# Patient Record
Sex: MALE | Employment: UNEMPLOYED | URBAN - METROPOLITAN AREA
[De-identification: names, ages, dates, MRNs, and addresses within clinical notes are randomized per-mention and may not be internally consistent; named-entity substitution may affect disease eponyms.]

---

## 2017-02-04 ENCOUNTER — ED HISTORICAL/CONVERTED ENCOUNTER (OUTPATIENT)
Dept: OTHER | Age: 22
End: 2017-02-04

## 2017-02-23 ENCOUNTER — OP HISTORICAL/CONVERTED ENCOUNTER (OUTPATIENT)
Dept: OTHER | Age: 22
End: 2017-02-23

## 2022-07-22 ENCOUNTER — HOSPITAL ENCOUNTER (EMERGENCY)
Age: 27
Discharge: HOME | End: 2022-07-22
Attending: EMERGENCY MEDICINE | Admitting: EMERGENCY MEDICINE
Payer: MEDICAID

## 2022-07-22 ENCOUNTER — APPOINTMENT (OUTPATIENT)
Dept: CT IMAGING | Age: 27
End: 2022-07-22
Attending: NURSE PRACTITIONER
Payer: MEDICAID

## 2022-07-22 VITALS
WEIGHT: 235 LBS | RESPIRATION RATE: 20 BRPM | HEART RATE: 108 BPM | BODY MASS INDEX: 31.83 KG/M2 | HEIGHT: 72 IN | OXYGEN SATURATION: 92 % | DIASTOLIC BLOOD PRESSURE: 63 MMHG | SYSTOLIC BLOOD PRESSURE: 92 MMHG

## 2022-07-22 DIAGNOSIS — F48.9 MENTAL HEALTH PROBLEM: Primary | ICD-10-CM

## 2022-07-22 LAB
ALBUMIN SERPL-MCNC: 4.6 G/DL (ref 3.5–5)
ALBUMIN/GLOB SERPL: 1 {RATIO} (ref 1.1–2.2)
ALP SERPL-CCNC: 71 U/L (ref 45–117)
ALT SERPL-CCNC: 18 U/L (ref 12–78)
ANION GAP SERPL CALC-SCNC: 6 MMOL/L (ref 5–15)
APAP SERPL-MCNC: <10 UG/ML (ref 10–30)
AST SERPL W P-5'-P-CCNC: 12 U/L (ref 15–37)
BASOPHILS # BLD: 0 K/UL (ref 0–0.1)
BASOPHILS NFR BLD: 0 % (ref 0–1)
BILIRUB SERPL-MCNC: 0.6 MG/DL (ref 0.2–1)
BUN SERPL-MCNC: 15 MG/DL (ref 6–20)
BUN/CREAT SERPL: 24 (ref 12–20)
CA-I BLD-MCNC: 10.3 MG/DL (ref 8.5–10.1)
CHLORIDE SERPL-SCNC: 107 MMOL/L (ref 97–108)
CO2 SERPL-SCNC: 25 MMOL/L (ref 21–32)
CREAT SERPL-MCNC: 0.62 MG/DL (ref 0.7–1.3)
DIFFERENTIAL METHOD BLD: ABNORMAL
EOSINOPHIL # BLD: 0 K/UL (ref 0–0.4)
EOSINOPHIL NFR BLD: 0 % (ref 0–7)
ERYTHROCYTE [DISTWIDTH] IN BLOOD BY AUTOMATED COUNT: 13.8 % (ref 11.5–14.5)
ETHANOL SERPL-MCNC: <10 MG/DL
GLOBULIN SER CALC-MCNC: 4.7 G/DL (ref 2–4)
GLUCOSE SERPL-MCNC: 94 MG/DL (ref 65–100)
HCT VFR BLD AUTO: 44.6 % (ref 36.6–50.3)
HGB BLD-MCNC: 14 G/DL (ref 12.1–17)
IMM GRANULOCYTES # BLD AUTO: 0 K/UL (ref 0–0.04)
IMM GRANULOCYTES NFR BLD AUTO: 0 % (ref 0–0.5)
LYMPHOCYTES # BLD: 1.5 K/UL (ref 0.8–3.5)
LYMPHOCYTES NFR BLD: 20 % (ref 12–49)
MCH RBC QN AUTO: 22 PG (ref 26–34)
MCHC RBC AUTO-ENTMCNC: 31.4 G/DL (ref 30–36.5)
MCV RBC AUTO: 70.2 FL (ref 80–99)
MONOCYTES # BLD: 0.5 K/UL (ref 0–1)
MONOCYTES NFR BLD: 6 % (ref 5–13)
NEUTS SEG # BLD: 5.7 K/UL (ref 1.8–8)
NEUTS SEG NFR BLD: 74 % (ref 32–75)
NRBC # BLD: 0 K/UL (ref 0–0.01)
NRBC BLD-RTO: 0 PER 100 WBC
PLATELET # BLD AUTO: 220 K/UL (ref 150–400)
PMV BLD AUTO: 11.4 FL (ref 8.9–12.9)
POTASSIUM SERPL-SCNC: 4.2 MMOL/L (ref 3.5–5.1)
PROT SERPL-MCNC: 9.3 G/DL (ref 6.4–8.2)
RBC # BLD AUTO: 6.35 M/UL (ref 4.1–5.7)
SALICYLATES SERPL-MCNC: <1.7 MG/DL (ref 2.8–20)
SODIUM SERPL-SCNC: 138 MMOL/L (ref 136–145)
VALPROATE SERPL-MCNC: 95 UG/ML (ref 50–100)
WBC # BLD AUTO: 7.7 K/UL (ref 4.1–11.1)

## 2022-07-22 PROCEDURE — 96372 THER/PROPH/DIAG INJ SC/IM: CPT

## 2022-07-22 PROCEDURE — 99284 EMERGENCY DEPT VISIT MOD MDM: CPT

## 2022-07-22 PROCEDURE — 85025 COMPLETE CBC W/AUTO DIFF WBC: CPT

## 2022-07-22 PROCEDURE — 80143 DRUG ASSAY ACETAMINOPHEN: CPT

## 2022-07-22 PROCEDURE — 80179 DRUG ASSAY SALICYLATE: CPT

## 2022-07-22 PROCEDURE — 36415 COLL VENOUS BLD VENIPUNCTURE: CPT

## 2022-07-22 PROCEDURE — 80164 ASSAY DIPROPYLACETIC ACD TOT: CPT

## 2022-07-22 PROCEDURE — 70450 CT HEAD/BRAIN W/O DYE: CPT

## 2022-07-22 PROCEDURE — 80053 COMPREHEN METABOLIC PANEL: CPT

## 2022-07-22 PROCEDURE — 82077 ASSAY SPEC XCP UR&BREATH IA: CPT

## 2022-07-22 PROCEDURE — 74011250636 HC RX REV CODE- 250/636: Performed by: NURSE PRACTITIONER

## 2022-07-22 RX ORDER — ZIPRASIDONE MESYLATE 20 MG/ML
20 INJECTION, POWDER, LYOPHILIZED, FOR SOLUTION INTRAMUSCULAR
Status: COMPLETED | OUTPATIENT
Start: 2022-07-22 | End: 2022-07-22

## 2022-07-22 RX ADMIN — ZIPRASIDONE MESYLATE 20 MG: 20 INJECTION, POWDER, LYOPHILIZED, FOR SOLUTION INTRAMUSCULAR at 10:39

## 2022-07-22 NOTE — BSMART NOTE
Attempted to assess pt. Pt is resting with eyes closed as he received an injection in the ED and is unable to be aroused at this time by writer or mother for assessment. Mom states pt is not SI HI or having hallucinations. States he has been having altered mental since having a sx last Friday. States she took him to [facility] and he refused to talk with them and was instructed to bring him to the ED for a brain scan. Mom states he is Moderate ID and has behavioral issues. States he has never been IP .

## 2022-07-22 NOTE — ED PROVIDER NOTES
EMERGENCY DEPARTMENT HISTORY AND PHYSICAL EXAM      Date: 7/22/2022  Patient Name: Maury Hendrix    History of Presenting Illness     Chief Complaint   Patient presents with    Altered mental status       History Provided By: Patient's Father and Patient's Mother    HPI: Maury Hendrix, 32 y.o. male with a significant past medical history of intellectual disability, ADHD, seizures presents to the ED with altered mental status. Patient had a seizure last Friday. Per parents patient has not been acting on his norm. Patient usually able to cook for himself. Patient has not been doing this all week. Patient comes in for evaluation of this. Patient's family also requesting a head CT. There are no other complaints, changes, or physical findings at this time. PCP: None    No current facility-administered medications on file prior to encounter. No current outpatient medications on file prior to encounter. Past History     Past Medical History:  No past medical history on file. Past Surgical History:  No past surgical history on file. Family History:  No family history on file. Social History: Allergies:  No Known Allergies    Review of Systems   Review of Systems   Unable to perform ROS: Psychiatric disorder     Physical Exam   Physical Exam  Constitutional:       General: He is not in acute distress. Appearance: Normal appearance. He is obese. He is not ill-appearing or toxic-appearing. HENT:      Head: Normocephalic and atraumatic. Nose: Nose normal.      Mouth/Throat:      Mouth: Mucous membranes are moist.   Eyes:      Extraocular Movements: Extraocular movements intact. Pupils: Pupils are equal, round, and reactive to light. Cardiovascular:      Rate and Rhythm: Normal rate and regular rhythm. Pulmonary:      Effort: Pulmonary effort is normal.      Breath sounds: Normal breath sounds.    Abdominal:      General: Bowel sounds are normal. Musculoskeletal:         General: Normal range of motion. Cervical back: Normal range of motion and neck supple. Skin:     General: Skin is warm and dry. Capillary Refill: Capillary refill takes less than 2 seconds. Neurological:      General: No focal deficit present. Mental Status: He is alert. Mental status is at baseline. Psychiatric:         Attention and Perception: He is inattentive. Mood and Affect: Mood is anxious. Speech: Speech is delayed. Behavior: Behavior is agitated. Lab and Diagnostic Study Results   Labs -     Recent Results (from the past 12 hour(s))   METABOLIC PANEL, COMPREHENSIVE    Collection Time: 07/22/22  9:44 AM   Result Value Ref Range    Sodium 138 136 - 145 mmol/L    Potassium 4.2 3.5 - 5.1 mmol/L    Chloride 107 97 - 108 mmol/L    CO2 25 21 - 32 mmol/L    Anion gap 6 5 - 15 mmol/L    Glucose 94 65 - 100 mg/dL    BUN 15 6 - 20 mg/dL    Creatinine 0.62 (L) 0.70 - 1.30 mg/dL    BUN/Creatinine ratio 24 (H) 12 - 20      GFR est AA >60 >60 ml/min/1.73m2    GFR est non-AA >60 >60 ml/min/1.73m2    Calcium 10.3 (H) 8.5 - 10.1 mg/dL    Bilirubin, total 0.6 0.2 - 1.0 mg/dL    AST (SGOT) 12 (L) 15 - 37 U/L    ALT (SGPT) 18 12 - 78 U/L    Alk.  phosphatase 71 45 - 117 U/L    Protein, total 9.3 (H) 6.4 - 8.2 g/dL    Albumin 4.6 3.5 - 5.0 g/dL    Globulin 4.7 (H) 2.0 - 4.0 g/dL    A-G Ratio 1.0 (L) 1.1 - 2.2     CBC WITH AUTOMATED DIFF    Collection Time: 07/22/22  9:44 AM   Result Value Ref Range    WBC 7.7 4.1 - 11.1 K/uL    RBC 6.35 (H) 4.10 - 5.70 M/uL    HGB 14.0 12.1 - 17.0 g/dL    HCT 44.6 36.6 - 50.3 %    MCV 70.2 (L) 80.0 - 99.0 FL    MCH 22.0 (L) 26.0 - 34.0 PG    MCHC 31.4 30.0 - 36.5 g/dL    RDW 13.8 11.5 - 14.5 %    PLATELET 837 114 - 790 K/uL    MPV 11.4 8.9 - 12.9 FL    NRBC 0.0 0.0  WBC    ABSOLUTE NRBC 0.00 0.00 - 0.01 K/uL    NEUTROPHILS 74 32 - 75 %    LYMPHOCYTES 20 12 - 49 %    MONOCYTES 6 5 - 13 %    EOSINOPHILS 0 0 - 7 % BASOPHILS 0 0 - 1 %    IMMATURE GRANULOCYTES 0 0 - 0.5 %    ABS. NEUTROPHILS 5.7 1.8 - 8.0 K/UL    ABS. LYMPHOCYTES 1.5 0.8 - 3.5 K/UL    ABS. MONOCYTES 0.5 0.0 - 1.0 K/UL    ABS. EOSINOPHILS 0.0 0.0 - 0.4 K/UL    ABS. BASOPHILS 0.0 0.0 - 0.1 K/UL    ABS. IMM. GRANS. 0.0 0.00 - 0.04 K/UL    DF AUTOMATED     ETHYL ALCOHOL    Collection Time: 07/22/22  9:44 AM   Result Value Ref Range    ALCOHOL(ETHYL),SERUM <24 <90 mg/dL   SALICYLATE    Collection Time: 07/22/22  9:44 AM   Result Value Ref Range    Salicylate level <9.8 (L) 2.8 - 20.0 mg/dL   ACETAMINOPHEN    Collection Time: 07/22/22  9:44 AM   Result Value Ref Range    Acetaminophen level <10 (L) 10 - 30 ug/mL   VALPROIC ACID    Collection Time: 07/22/22  9:44 AM   Result Value Ref Range    Valproic acid 95 50 - 100 ug/mL       Radiologic Studies -   @lastxrresult@  CT Results  (Last 48 hours)                 07/22/22 1135  CT HEAD WO CONT Final result    Impression:  No acute process or change compared to the prior exam.               Narrative:  EXAM: CT HEAD WO CONT       INDICATION: ams       COMPARISON: 2/4/2017. CONTRAST: None. TECHNIQUE: Unenhanced CT of the head was performed using 5 mm images. Brain and   bone windows were generated. Coronal and sagittal reformats. CT dose reduction   was achieved through use of a standardized protocol tailored for this   examination and automatic exposure control for dose modulation. FINDINGS:   The ventricles and sulci are normal in size, shape and configuration. . There is   no significant white matter disease. There is no intracranial hemorrhage,   extra-axial collection, or mass effect. The basilar cisterns are open. No CT   evidence of acute infarct. The bone windows demonstrate no abnormalities. The visualized portions of the   paranasal sinuses and mastoid air cells are clear.                  CXR Results  (Last 48 hours)      None            Medical Decision Making and ED Course   - I am the first provider for this patient. I reviewed the vital signs, available nursing notes, past medical history, past surgical history, family history and social history. - Initial assessment performed. The patients presenting problems have been discussed, and they are in agreement with the care plan formulated and outlined with them. I have encouraged them to ask questions as they arise throughout their visit. Vital Signs-Reviewed the patient's vital signs. Patient Vitals for the past 12 hrs:   Pulse Resp BP SpO2   07/22/22 0912 (!) 108 20 92/63 92 %       Differential Diagnosis & Medical Decision Making Provider Note:   Patient presenting with altered mental status. Pt has stable vitals and POC glucose was checked immediately upon arrival. DDx: medication toxicity, infection, anemia, electrolyte/metabolic anomoly, hypercapnea, stroke/bleed/mass, dehydration, illicit drug intoxication. Will obtain labwork, UA, EKG and CT imaging of the head, chest xray. Will consider adding toxicologic workup if history unclear or warrants further investigation of toxic source. Will continue to monitor and reassess for admission. [facility]       ED Course:        Procedures   Performed by: Kari Terry NP  Procedures      Disposition   Disposition: DC- Pediatric Discharges: All of the diagnostic tests were reviewed with the parent and their questions were answered. The parent verbally convey understanding and agreement of the signs, symptoms, diagnosis, treatment and prognosis for the child and additionally agrees to follow up as recommended with the child's PCP in 24 - 48 hours. They also agree with the care-plan and conveys that all of their questions have been answered.   I have put together some discharge instructions for them that include: 1) educational information regarding their diagnosis, 2) how to care for the child's diagnosis at home, as well a 3) list of reasons why they would want to return the child to the ED prior to their follow-up appointment, should their condition change. Discharged    DISCHARGE PLAN:  1. There are no discharge medications for this patient. 2.   Follow-up Information       Follow up With Specialties Details Why Contact Info    Your PCP              3.  Return to ED if worse   4. There are no discharge medications for this patient. Diagnosis/Clinical Impression     Clinical Impression:   1. Mental health problem        Attestations: Liam ALEGRIA NP, am the primary clinician of record. Please note that this dictation was completed with [email or web address], the [employer] voice recognition software. Quite often unanticipated grammatical, syntax, homophones, and other interpretive errors are inadvertently transcribed by the computer software. Please disregard these errors. Please excuse any errors that have escaped final proofreading. Thank you.

## 2022-07-22 NOTE — ED TRIAGE NOTES
PER MOTHER PT HAD A SEIZURE LAST FRIDAY AND SINCE THEN BEHAVIOR HAS BEEN ERRATIC, AGITATED AND TEARFUL IN TRIAGE

## 2022-07-24 ENCOUNTER — HOSPITAL ENCOUNTER (INPATIENT)
Age: 27
LOS: 8 days | Discharge: HOME | DRG: 750 | End: 2022-08-03
Attending: EMERGENCY MEDICINE | Admitting: PSYCHIATRY & NEUROLOGY
Payer: MEDICAID

## 2022-07-24 DIAGNOSIS — F22 PARANOID BEHAVIOR (HCC): Primary | ICD-10-CM

## 2022-07-24 LAB
ALBUMIN SERPL-MCNC: 4.4 G/DL (ref 3.5–5)
ALBUMIN/GLOB SERPL: 1.1 {RATIO} (ref 1.1–2.2)
ALP SERPL-CCNC: 65 U/L (ref 45–117)
ALT SERPL-CCNC: 22 U/L (ref 12–78)
ANION GAP SERPL CALC-SCNC: 7 MMOL/L (ref 5–15)
AST SERPL W P-5'-P-CCNC: 28 U/L (ref 15–37)
BASOPHILS # BLD: 0 K/UL (ref 0–0.1)
BASOPHILS NFR BLD: 0 % (ref 0–1)
BILIRUB SERPL-MCNC: 0.6 MG/DL (ref 0.2–1)
BUN SERPL-MCNC: 13 MG/DL (ref 6–20)
BUN/CREAT SERPL: 21 (ref 12–20)
CA-I BLD-MCNC: 10.3 MG/DL (ref 8.5–10.1)
CHLORIDE SERPL-SCNC: 107 MMOL/L (ref 97–108)
CO2 SERPL-SCNC: 26 MMOL/L (ref 21–32)
CREAT SERPL-MCNC: 0.62 MG/DL (ref 0.7–1.3)
DIFFERENTIAL METHOD BLD: ABNORMAL
EOSINOPHIL # BLD: 0 K/UL (ref 0–0.4)
EOSINOPHIL NFR BLD: 0 % (ref 0–7)
ERYTHROCYTE [DISTWIDTH] IN BLOOD BY AUTOMATED COUNT: 13.1 % (ref 11.5–14.5)
ETHANOL SERPL-MCNC: <10 MG/DL
FLUAV RNA SPEC QL NAA+PROBE: NOT DETECTED
FLUBV RNA SPEC QL NAA+PROBE: NOT DETECTED
GLOBULIN SER CALC-MCNC: 4.1 G/DL (ref 2–4)
GLUCOSE SERPL-MCNC: 85 MG/DL (ref 65–100)
HCT VFR BLD AUTO: 42.2 % (ref 36.6–50.3)
HGB BLD-MCNC: 13.2 G/DL (ref 12.1–17)
IMM GRANULOCYTES # BLD AUTO: 0 K/UL (ref 0–0.04)
IMM GRANULOCYTES NFR BLD AUTO: 0 % (ref 0–0.5)
LYMPHOCYTES # BLD: 1.6 K/UL (ref 0.8–3.5)
LYMPHOCYTES NFR BLD: 15 % (ref 12–49)
MCH RBC QN AUTO: 22 PG (ref 26–34)
MCHC RBC AUTO-ENTMCNC: 31.3 G/DL (ref 30–36.5)
MCV RBC AUTO: 70.5 FL (ref 80–99)
MONOCYTES # BLD: 0.9 K/UL (ref 0–1)
MONOCYTES NFR BLD: 9 % (ref 5–13)
NEUTS SEG # BLD: 8 K/UL (ref 1.8–8)
NEUTS SEG NFR BLD: 76 % (ref 32–75)
NRBC # BLD: 0 K/UL (ref 0–0.01)
NRBC BLD-RTO: 0 PER 100 WBC
PLATELET # BLD AUTO: 180 K/UL (ref 150–400)
PMV BLD AUTO: 11.9 FL (ref 8.9–12.9)
POTASSIUM SERPL-SCNC: 3.8 MMOL/L (ref 3.5–5.1)
PROT SERPL-MCNC: 8.5 G/DL (ref 6.4–8.2)
RBC # BLD AUTO: 5.99 M/UL (ref 4.1–5.7)
SARS-COV-2, COV2: DETECTED
SODIUM SERPL-SCNC: 140 MMOL/L (ref 136–145)
VALPROATE SERPL-MCNC: 39 UG/ML (ref 50–100)
WBC # BLD AUTO: 10.5 K/UL (ref 4.1–11.1)

## 2022-07-24 PROCEDURE — 80053 COMPREHEN METABOLIC PANEL: CPT

## 2022-07-24 PROCEDURE — 80164 ASSAY DIPROPYLACETIC ACD TOT: CPT

## 2022-07-24 PROCEDURE — 99285 EMERGENCY DEPT VISIT HI MDM: CPT

## 2022-07-24 PROCEDURE — 82077 ASSAY SPEC XCP UR&BREATH IA: CPT

## 2022-07-24 PROCEDURE — 85025 COMPLETE CBC W/AUTO DIFF WBC: CPT

## 2022-07-24 PROCEDURE — 74011250636 HC RX REV CODE- 250/636: Performed by: NURSE PRACTITIONER

## 2022-07-24 PROCEDURE — 36415 COLL VENOUS BLD VENIPUNCTURE: CPT

## 2022-07-24 PROCEDURE — 87636 SARSCOV2 & INF A&B AMP PRB: CPT

## 2022-07-24 PROCEDURE — 74011250636 HC RX REV CODE- 250/636: Performed by: PHYSICIAN ASSISTANT

## 2022-07-24 PROCEDURE — 96372 THER/PROPH/DIAG INJ SC/IM: CPT

## 2022-07-24 RX ORDER — DIPHENHYDRAMINE HYDROCHLORIDE 50 MG/ML
25 INJECTION, SOLUTION INTRAMUSCULAR; INTRAVENOUS
Status: COMPLETED | OUTPATIENT
Start: 2022-07-24 | End: 2022-07-24

## 2022-07-24 RX ORDER — ZIPRASIDONE MESYLATE 20 MG/ML
20 INJECTION, POWDER, LYOPHILIZED, FOR SOLUTION INTRAMUSCULAR
Status: COMPLETED | OUTPATIENT
Start: 2022-07-24 | End: 2022-07-24

## 2022-07-24 RX ORDER — DIAZEPAM 10 MG/100UL
SPRAY NASAL
COMMUNITY
End: 2022-08-03

## 2022-07-24 RX ORDER — HALOPERIDOL 5 MG/ML
5 INJECTION INTRAMUSCULAR ONCE
Status: COMPLETED | OUTPATIENT
Start: 2022-07-24 | End: 2022-07-24

## 2022-07-24 RX ORDER — VALPROIC ACID 250 MG/5ML
1000 SOLUTION ORAL 2 TIMES DAILY
Status: DISPENSED | OUTPATIENT
Start: 2022-07-24 | End: 2022-07-29

## 2022-07-24 RX ORDER — ESLICARBAZEPINE ACETATE 800 MG/1
TABLET ORAL
COMMUNITY
End: 2022-08-03

## 2022-07-24 RX ORDER — ESLICARBAZEPINE ACETATE 400 MG/1
TABLET ORAL
COMMUNITY
End: 2022-08-03

## 2022-07-24 RX ORDER — VALPROIC ACID 250 MG/5ML
SOLUTION ORAL
COMMUNITY
End: 2022-08-03

## 2022-07-24 RX ADMIN — HALOPERIDOL LACTATE 5 MG: 5 INJECTION, SOLUTION INTRAMUSCULAR at 13:23

## 2022-07-24 RX ADMIN — DIPHENHYDRAMINE HYDROCHLORIDE 25 MG: 50 INJECTION, SOLUTION INTRAMUSCULAR; INTRAVENOUS at 13:23

## 2022-07-24 RX ADMIN — ZIPRASIDONE MESYLATE 20 MG: 20 INJECTION, POWDER, LYOPHILIZED, FOR SOLUTION INTRAMUSCULAR at 22:50

## 2022-07-24 RX ADMIN — ZIPRASIDONE MESYLATE 20 MG: 20 INJECTION, POWDER, LYOPHILIZED, FOR SOLUTION INTRAMUSCULAR at 13:23

## 2022-07-24 NOTE — BSMART NOTE
Copies of all labs resulted and facesheet faxed to Patricia with [employer] per her request.  She is aware that pt is COVID positive

## 2022-07-24 NOTE — ED NOTES
This RN, Johny Arzate RN, Ancelmo Powell PCA, and [employer] officer at bedside to obtain ordered labs and COVID swab. Pt thrashing around in stretcher and all staff having to restrain pt even with pt still handcuffed to stretcher. This RN unable to obtain urine sample at this time. TIMOTHY

## 2022-07-24 NOTE — ED NOTES
Pt continues to be agitated and attempting to get out of bed. Pt remains in  Bilateral handcuffs and bilateral leg restraints. [employer] officer and Sitter present.

## 2022-07-24 NOTE — BSMART NOTE
This writer left message on mother's voicemail to give her an update and to let her know that bed search is under way

## 2022-07-24 NOTE — ED TRIAGE NOTES
Per [place] [employer] parents made a call stating \"he's having a psychotic episode\". When [employer] arrived pt was locked in a car and was trying to get out of the car screaming \"my stepfather trying to kill me\". Pt brought in by [employer] w/ a paperless ECO. Pt continues to be aggressive and yelling at staff. Pt currently handcuffed to stretcher.

## 2022-07-24 NOTE — BSMART NOTE
Patricia ([employer]) called this writer to make me aware that pt was ECO. She has contacted [facility] (due to pts ID diagnosis). ECO start time 5am.  This writer will notify Patricia with pt able to be prescreened.   [facility] will join via iPad as well

## 2022-07-24 NOTE — BSMART NOTE
Patricia with [employer] attempted to complete prescreening assessment with pt along with Kurt Barry from [facility]. Pt will not cooperate. Pt yelling out 'my stepdad is trying to kill me' over and over. Pt NOT redirectable. Pts mother, Dax Back, completed interview with Patricia via iPad/Zoom. Pending medical clearance, pt will be recommended for TDO. Pt mother states pt takes Valproic Acid BID however did not take it today. Per Jose BELL verbal order, medication to be ordered by this writer in [employer].   Primary nurse Sharee Rubalcava, aware

## 2022-07-24 NOTE — ED PROVIDER NOTES
EMERGENCY DEPARTMENT HISTORY AND PHYSICAL EXAM      Date: 7/24/2022  Patient Name: Reford Runner    History of Presenting Illness     Chief Complaint   Patient presents with    Mental Health Problem       History Provided By: EMS    HPI: Reford Runner, 32 y.o. male with a significant past medical history of intellectual disability, ADHD, seizures who presents to the ED with psychotic episode\". When [employer] arrived pt was locked in a car and was trying to get out of the car screaming \"my stepfather trying to kill me\". Pt brought in by [employer] w/ a paperless ECO. Pt continues to be aggressive and yelling at staff. Pt currently handcuffed to stretcher. There are no other complaints, changes, or physical findings at this time. PCP: None    No current facility-administered medications on file prior to encounter. No current outpatient medications on file prior to encounter. Past History     Past Medical History:  No past medical history on file. Past Surgical History:  No past surgical history on file. Family History:  No family history on file. Social History: Allergies:  No Known Allergies    Review of Systems   Review of Systems   Unable to perform ROS: Psychiatric disorder     Physical Exam     Lab and Diagnostic Study Results   Labs -   No results found for this or any previous visit (from the past 12 hour(s)). Radiologic Studies -   @lastxrresult@  CT Results  (Last 48 hours)                 07/22/22 1135  CT HEAD WO CONT Final result    Impression:  No acute process or change compared to the prior exam.               Narrative:  EXAM: CT HEAD WO CONT       INDICATION: ams       COMPARISON: 2/4/2017. CONTRAST: None. TECHNIQUE: Unenhanced CT of the head was performed using 5 mm images. Brain and   bone windows were generated. Coronal and sagittal reformats.  CT dose reduction   was achieved through use of a standardized protocol tailored for this   examination and automatic exposure control for dose modulation. FINDINGS:   The ventricles and sulci are normal in size, shape and configuration. . There is   no significant white matter disease. There is no intracranial hemorrhage,   extra-axial collection, or mass effect. The basilar cisterns are open. No CT   evidence of acute infarct. The bone windows demonstrate no abnormalities. The visualized portions of the   paranasal sinuses and mastoid air cells are clear. CXR Results  (Last 48 hours)      None            Medical Decision Making and ED Course   - I am the first provider for this patient. I reviewed the vital signs, available nursing notes, past medical history, past surgical history, family history and social history. - Initial assessment performed. The patients presenting problems have been discussed, and they are in agreement with the care plan formulated and outlined with them. I have encouraged them to ask questions as they arise throughout their visit. Vital Signs-Reviewed the patient's vital signs. Patient Vitals for the past 12 hrs:   Pulse Resp BP SpO2   07/24/22 0952 96 18 104/62 99 %       Differential Diagnosis & Medical Decision Making Provider Note:   Order psych labs and   made aware of patient. MDM     Amount and/or Complexity of Data Reviewed  Clinical lab tests: ordered and reviewed    Risk of Complications, Morbidity, and/or Mortality  Presenting problems: high  Management options: high    Patient Progress  Patient progress: stable       ED Course:   ED Course as of 09/18/22 1530   Sun Jul 24, 2022   1848 Spoke with both district Plummer and Zulema Cohen. While patient is COVID-positive, the issue with admitting him to [facility] is that psychiatry normally does not admit intellectual disability unless he is high functioning.   While his mother has stated he is high functioning, patient has not been able to converse with us and show us that he is high functioning. Francesca Marshall will try to speak with Dr. Luciana Willard and do a reevaluation tomorrow to see if patient would be a potential psych admission here at [facility]. [JS]   Mon Jul 25, 2022   0155 Patient remains on one-to-one. Patient resting comfortably in bed [CB]   0656 COVID positive, needs re-eval by Dr. Marlen Vázquez for dispo. [SS]      ED Course User Index  [CB] Ana Paula Ybarra NP  [JS] Trixie Hernandez MD  [SS] Jammie Estrella MD       Procedures   Performed by: ARABELLA Quijano  Procedures    Disposition   Disposition: Admitted to [address] [place] [address] at [facility] the case was discussed with the admitting physician MATTHEW      DISCHARGE PLAN:  1. There are no discharge medications for this patient. 2.   Follow-up Information    None       3. Return to ED if worse   4. There are no discharge medications for this patient. Remove if admitted/transferred    Diagnosis/Clinical Impression     Clinical Impression: No diagnosis found. Pyschosis    Attestations: IDarell PA, am the primary clinician of record. Please note that this dictation was completed with [employer], the computer voice recognition software. Quite often unanticipated grammatical, syntax, homophones, and other interpretive errors are inadvertently transcribed by the computer software. Please disregard these errors. Please excuse any errors that have escaped final proofreading. Thank you.

## 2022-07-25 PROBLEM — U07.1 COVID-19: Status: ACTIVE | Noted: 2022-07-25

## 2022-07-25 PROBLEM — F23 ACUTE PSYCHOSIS (HCC): Status: ACTIVE | Noted: 2022-07-25

## 2022-07-25 LAB
AMPHET UR QL SCN: NEGATIVE
APPEARANCE UR: ABNORMAL
BACTERIA URNS QL MICRO: NEGATIVE /HPF
BARBITURATES UR QL SCN: NEGATIVE
BENZODIAZ UR QL: NEGATIVE
BILIRUB UR QL: NEGATIVE
CANNABINOIDS UR QL SCN: NEGATIVE
COCAINE UR QL SCN: NEGATIVE
COLOR UR: ABNORMAL
DRUG SCRN COMMENT,DRGCM: NORMAL
GLUCOSE UR STRIP.AUTO-MCNC: NEGATIVE MG/DL
HGB UR QL STRIP: ABNORMAL
KETONES UR QL STRIP.AUTO: 20 MG/DL
LEUKOCYTE ESTERASE UR QL STRIP.AUTO: ABNORMAL
METHADONE UR QL: NEGATIVE
MUCOUS THREADS URNS QL MICRO: ABNORMAL /LPF
NITRITE UR QL STRIP.AUTO: NEGATIVE
OPIATES UR QL: NEGATIVE
PCP UR QL: NEGATIVE
PH UR STRIP: 5 [PH] (ref 5–8)
PROT UR STRIP-MCNC: 100 MG/DL
RBC #/AREA URNS HPF: ABNORMAL /HPF (ref 0–5)
SP GR UR REFRACTOMETRY: 1.03 (ref 1–1.03)
UA: UC IF INDICATED,UAUC: ABNORMAL
UROBILINOGEN UR QL STRIP.AUTO: 2 EU/DL (ref 0.1–1)
WBC URNS QL MICRO: ABNORMAL /HPF (ref 0–4)

## 2022-07-25 PROCEDURE — 74011250636 HC RX REV CODE- 250/636: Performed by: INTERNAL MEDICINE

## 2022-07-25 PROCEDURE — 74011250637 HC RX REV CODE- 250/637: Performed by: PSYCHIATRY & NEUROLOGY

## 2022-07-25 PROCEDURE — 74011250637 HC RX REV CODE- 250/637: Performed by: INTERNAL MEDICINE

## 2022-07-25 PROCEDURE — 74011250637 HC RX REV CODE- 250/637: Performed by: PHYSICIAN ASSISTANT

## 2022-07-25 PROCEDURE — 81001 URINALYSIS AUTO W/SCOPE: CPT

## 2022-07-25 PROCEDURE — 80307 DRUG TEST PRSMV CHEM ANLYZR: CPT

## 2022-07-25 RX ORDER — SODIUM CHLORIDE 0.9 % (FLUSH) 0.9 %
5-40 SYRINGE (ML) INJECTION EVERY 8 HOURS
Status: DISCONTINUED | OUTPATIENT
Start: 2022-07-25 | End: 2022-08-03 | Stop reason: HOSPADM

## 2022-07-25 RX ORDER — ENOXAPARIN SODIUM 100 MG/ML
30 INJECTION SUBCUTANEOUS EVERY 12 HOURS
Status: DISCONTINUED | OUTPATIENT
Start: 2022-07-25 | End: 2022-08-03 | Stop reason: HOSPADM

## 2022-07-25 RX ORDER — DEXAMETHASONE 4 MG/1
6 TABLET ORAL DAILY
Status: COMPLETED | OUTPATIENT
Start: 2022-07-25 | End: 2022-08-03

## 2022-07-25 RX ORDER — ACETAMINOPHEN 650 MG/1
650 SUPPOSITORY RECTAL
Status: DISCONTINUED | OUTPATIENT
Start: 2022-07-25 | End: 2022-08-03 | Stop reason: HOSPADM

## 2022-07-25 RX ORDER — HYDROXYZINE 25 MG/1
50 TABLET, FILM COATED ORAL
Status: DISCONTINUED | OUTPATIENT
Start: 2022-07-25 | End: 2022-08-03 | Stop reason: HOSPADM

## 2022-07-25 RX ORDER — SODIUM CHLORIDE 0.9 % (FLUSH) 0.9 %
5-40 SYRINGE (ML) INJECTION AS NEEDED
Status: DISCONTINUED | OUTPATIENT
Start: 2022-07-25 | End: 2022-08-03 | Stop reason: HOSPADM

## 2022-07-25 RX ORDER — POLYETHYLENE GLYCOL 3350 17 G/17G
17 POWDER, FOR SOLUTION ORAL DAILY PRN
Status: DISCONTINUED | OUTPATIENT
Start: 2022-07-25 | End: 2022-08-03 | Stop reason: HOSPADM

## 2022-07-25 RX ORDER — HALOPERIDOL 5 MG/ML
5 INJECTION INTRAMUSCULAR
Status: DISCONTINUED | OUTPATIENT
Start: 2022-07-25 | End: 2022-08-03 | Stop reason: HOSPADM

## 2022-07-25 RX ORDER — ACETAMINOPHEN 325 MG/1
650 TABLET ORAL
Status: DISCONTINUED | OUTPATIENT
Start: 2022-07-25 | End: 2022-08-03 | Stop reason: HOSPADM

## 2022-07-25 RX ORDER — DIPHENHYDRAMINE HYDROCHLORIDE 50 MG/ML
50 INJECTION, SOLUTION INTRAMUSCULAR; INTRAVENOUS
Status: DISCONTINUED | OUTPATIENT
Start: 2022-07-25 | End: 2022-08-03 | Stop reason: HOSPADM

## 2022-07-25 RX ORDER — QUETIAPINE FUMARATE 25 MG/1
50 TABLET, FILM COATED ORAL 3 TIMES DAILY
Status: DISCONTINUED | OUTPATIENT
Start: 2022-07-25 | End: 2022-08-03 | Stop reason: HOSPADM

## 2022-07-25 RX ORDER — OLANZAPINE 5 MG/1
5 TABLET ORAL
Status: DISCONTINUED | OUTPATIENT
Start: 2022-07-25 | End: 2022-07-25

## 2022-07-25 RX ORDER — ADHESIVE BANDAGE
30 BANDAGE TOPICAL DAILY PRN
Status: DISCONTINUED | OUTPATIENT
Start: 2022-07-25 | End: 2022-08-03 | Stop reason: HOSPADM

## 2022-07-25 RX ORDER — TRAZODONE HYDROCHLORIDE 50 MG/1
50 TABLET ORAL
Status: DISCONTINUED | OUTPATIENT
Start: 2022-07-25 | End: 2022-08-03 | Stop reason: HOSPADM

## 2022-07-25 RX ORDER — AMOXICILLIN AND CLAVULANATE POTASSIUM 500; 125 MG/1; MG/1
1 TABLET, FILM COATED ORAL
Status: DISCONTINUED | OUTPATIENT
Start: 2022-07-25 | End: 2022-08-03 | Stop reason: HOSPADM

## 2022-07-25 RX ADMIN — AMOXICILLIN AND CLAVULANATE POTASSIUM 1 TABLET: 500; 125 TABLET, FILM COATED ORAL at 18:56

## 2022-07-25 RX ADMIN — VALPROIC ACID 1000 MG: 250 SOLUTION ORAL at 18:56

## 2022-07-25 RX ADMIN — QUETIAPINE FUMARATE 50 MG: 25 TABLET ORAL at 23:05

## 2022-07-25 RX ADMIN — ENOXAPARIN SODIUM 30 MG: 100 INJECTION SUBCUTANEOUS at 23:06

## 2022-07-25 RX ADMIN — QUETIAPINE FUMARATE 50 MG: 25 TABLET ORAL at 18:26

## 2022-07-25 RX ADMIN — DEXAMETHASONE 6 MG: 4 TABLET ORAL at 18:26

## 2022-07-25 RX ADMIN — ENOXAPARIN SODIUM 30 MG: 100 INJECTION SUBCUTANEOUS at 18:26

## 2022-07-25 RX ADMIN — TRAZODONE HYDROCHLORIDE 50 MG: 50 TABLET ORAL at 23:05

## 2022-07-25 NOTE — BSMART NOTE
Dr Lissette Kahn rounded on pt and states that he will accept pt to the medical floor (pt is COVID+).   Sangita Suggs, bed coordinator, aware and will call this writer back with bed assignment

## 2022-07-25 NOTE — ED NOTES
Angelique Zamora with Jennifer Herrera RN have completed a preliminary search of belonging in the presence of Varghese Mohan. Personal belongings list:  Please Document in Narrator Under - Valuables      Personal belongings placed in belongings bag, patient label placed on the outside of the bag, and placed in a bin at nurse's station three. Psych safe room setup completed, ligature risk items removed/ secured,  drawers and cabinets locked. Plan of care communicated with Varghese Mohan. Continuous observer at bedside with [place] officer.

## 2022-07-25 NOTE — ED NOTES
Case discussed with Nephrology, plan for hemodialysis on day of admission    Care coordinated with nursing staff      End-stage renal disease, on hemodialysis Tuesday Thursday Saturday normally Pt is upright eating breakfast. Pt assisted toileting with urinal. Behavioral health specialist at bedside rounding on patient. Constant observer and  at bedside. Will continue to observe for any changes.

## 2022-07-25 NOTE — ED NOTES
Awoke the patient during rounding. Offered patient food. Patient currently eating and drinking. Patient also urinated in urinal.    Patient continued with continuous observer and law enforcement at bedside. Patient continued in 2 point bilateral ankle restraints. (See restraint documentation). 1 handcuff removed for patient to use the bathroom and ear; patient continued in 1 handcuff via law enforcement.

## 2022-07-25 NOTE — BSMART NOTE
Pt's mother called. She was given update on pts condition and bed search status.     Dr Rodríguez Finely also aware of pt and will see him in the ER

## 2022-07-25 NOTE — CONSULTS
[address]    Name:  Satish Fraser  MR#:  [id number]  :  1995  ACCOUNT #:  [de-identified]  DATE OF SERVICE:  2022    PSYCHIATRIC CONSULTATION    REASON FOR CONSULTATION:  ER brought him under TDO. ORDERED BY:  ED doctor. This is a 30-year-old single [facility] American gentleman admitted to ED under ECO, brought by police department of [facility], sought by the mother. Apparently, he was paranoid, feeling that stepdad is trying to kill him, trying to run out, etc.  The patient is a poor historian. All he wanted to do was undo the handcuff. Information from mother says moderate intellectual disability, takes Depakote, some behavioral problems. Never had any inpatient behavioral health unit. Never had any suicidal attempt or homicidal attempt. Apparently, he is high functioning. Usually, he takes care of his own personal hygiene, grooming, cooking, eating, etc.  He cannot live on his own because of the financial constraints. Some degree of depression because he has been unable to work. Saw the patient, chart reviewed. He was here on 2022. At this time, he is under ECO and prescreened for potential TDO. He is also positive for COVID. PAST HISTORY:  No inpatient treatment. Only outpatient treatment. ALLERGIES TO MEDICATIONS:  NO KNOWN ALLERGIES. TRAUMA HISTORY:  None. FAMILY HISTORY:  Unknown. SUBSTANCE ABUSE HISTORY:  None. DIAGNOSTIC DATA:  He had a CT scan of the head that was done, unremarkable. LABORATORY DATA:  CBC:  Slightly elevated RBC 5.99, MCV 70, MCH 22. Metabolic Panel:  Creatinine 0.62. Liver enzymes are unremarkable. Alcohol level 10. COVID-19 detected. Influenza A and B not detected. Valproic acid level 39, subtherapeutic. We will increase the dose. Urinalysis:  Turbid, ketones 20, blood small, leukocyte esterase trace, wbc 5-10. Drug screen negative.     MENTAL STATUS EXAM:  Average height, medium-built gentleman, polite, pleasant, oriented. Sometimes, he thinks he was in snf. A little bit of time disorientation. Delusional thinking, stepfather trying to kill himself, etc.  Poor insight, poor judgment. No clear hallucination, but delusional.  Apparently, earlier when the police was bringing him in the ED, he was agitated, restless, tried to leave here. Memory recall is fair. IQ about limited. Insight poor, judgment poor. He is complying with the medications. Motor activity is variable. DIAGNOSES:  Moderate intelligence quotient with behavioral problems. Delusional thinking. DISPOSITION:  Continue Depakote. Probably, we will increase the dosage and give an antipsychotic medication such as Seroquel 50 mg 3 times a day and as he is COVID-positive, he will be admitted to medical floor but under the hospices of behavioral health unit under my attending. Whenever he is clear to go to behavioral health unit, we will transfer at that time. LENGTH OF STAY:  5-7 days. CRITERIA FOR DISCHARGE:  Free of paranoia, stable neurovegetative functioning, cope better. Return back home. We will monitor.       MD JULIA Espinoza/[email or web address]  D:  07/25/2022 15:50  T:  07/25/2022 18:56  JOB #:  [id number]

## 2022-07-25 NOTE — BSMART NOTE
Charge nurse [place] made aware that if pt had not gotten his tdap, then he still needs it.   Medication may need to be reordered if order has

## 2022-07-25 NOTE — BSMART NOTE
Patient rounded on in ER room 23. Officer remains outside of patient's room. Patient's bilateral wrists remain handcuffed to bed and bilateral ankles in soft restraints. Patient able to provide name, , mother's name and address. However, when asked where he is currently, patient states \"police station. \" Patient repeatedly asked for his pants. He also repeatedly asked \"will you take me home? \" At this time, patient urinated in urinal. He was also given food and water.  Officer uncuffed one wrist for patient to eat and use urinal.

## 2022-07-25 NOTE — BSMART NOTE
[facility] Liaison Team Note     LOS:  23:36     Patient goal(s) for today: \"go home\"  [facility] Liaison team focus/goals: provide support until an inpatient bed is obtained. Progress note: Pt was seen in ED room 23. He was sitting up in his bed and alert and oriented to person and place. His thought process was slightly disorganized. His appearance was disheveled. He was confused if he was \"arrested\" due to being handcuffed to the bed. Writer attempted to educate him, explaining that he needs some help for his mental health therefore he is \"detained\". He continued to have some confusion. Pt was cooperative but continued to state that he \"wanted to go home\". This writer asked him about his goals for today and he stated \"to go home\". This writer asked about some coping skills and suggested working on those and pt reported that he \"just wanted to go home\". Barriers to Discharge: TDO    Outpatient provider(s):  [facility]  Insurance info/prescription coverage:  [facility] MEDICAID/[facility] COMMUNITY PLAN [facility]    Diagnosis: Paranoia    Plan:  Continue to provide support until inpatient bed is obtained. Follow up Psych Consult placed? yes.    Psychiatrist updated? no       Participating treatment team members: Marissa Canas

## 2022-07-25 NOTE — ED NOTES
Pt screaming out of room. Yelling \"Nurse Ratchet bring me my pants. \" Attempted to reorient and help patient but pt continues to yell and scream from room. Unable to redirect patient. Pt continues to thrash around on the bed. Provider made aware and given meds per order. Remains under TDO order and officer at bedside. Sitter remains at bedside as well and pt also in view of nurses station.

## 2022-07-25 NOTE — CONSULTS
Consult    Patient: Brandyn Paiz MRN: [id number]  SSN: [id number]    YOB: 1995  Age: 32 y.o. Sex: male      Subjective:      Brandyn Paiz is a 32 y.o. male who is being seen for psychosis. Patient history of intellectual disability emergency department patient was positive for COVID-19 denies any shortness of breath. No past medical history on file. No past surgical history on file. No family history on file. Social History     Tobacco Use    Smoking status: Not on file    Smokeless tobacco: Not on file   Substance Use Topics    Alcohol use: Not on file      Current Facility-Administered Medications   Medication Dose Route Frequency Provider Last Rate Last Admin    valproic acid (as sodium salt) (DEPAKENE) 250 mg/5 mL (5 mL) oral solution 1,000 mg  1,000 mg Oral BID Darell Rhodes PA         Current Outpatient Medications   Medication Sig Dispense Refill    diazePAM (Valtoco) 20 mg/2 spray (10mg/0.1mL x2) spry Valtoco 20 mg/2 spray (10mg/0.1mL x2) nasal spray   Take 1 spray every day by nasal route as needed. eslicarbazepine (Aptiom) 400 mg tab Aptiom 400 mg tablet   Take 1 tablet every day by oral route.      eslicarbazepine (Aptiom) 800 mg tab tablet Aptiom 800 mg tablet   Take 1 tablet every day by oral route. perampaneL (Fycompa) 2 mg tablet Fycompa 2 mg tablet   Take 1 tablet every day by oral route. perampaneL (Fycompa) 8 mg tab tablet Fycompa 8 mg tablet   Take 1 tablet every day by oral route. valproate (DEPAKENE) 250 mg/5 mL syrup valproic acid (as sodium salt) 250 mg/5 mL oral solution   TAKE 20 ML TWICE A DAY BY ORAL ROUTE AS DIRECTED. No Known Allergies    Review of Systems:  A comprehensive review of systems was negative except for that written in the History of Present Illness.     Objective:     Vitals:    07/24/22 0927 07/24/22 0952 07/24/22 1816 07/25/22 0113   BP:  104/62 130/75 106/60   Pulse:  96 99 64   Resp:  18 20 14 Temp:  98.2 °F (36.8 °C) 98.1 °F (36.7 °C) 98 °F (36.7 °C)   SpO2:  99% 97% 98%   Weight: 113.4 kg (250 lb)      Height: 6' (1.829 m)           Physical Exam:  General:  Patient appears incoherent   Eyes:  Conjunctivae/corneas clear. PERRL, EOMs intact. Fundi benign   Ears:  Normal TMs and external ear canals both ears. Nose: Nares normal. Septum midline. Mucosa normal. No drainage or sinus tenderness. Mouth/Throat: Lips, mucosa, and tongue normal. Teeth and gums normal.   Neck: Supple, symmetrical, trachea midline, no adenopathy, thyroid: no enlargment/tenderness/nodules, no carotid bruit and no JVD. Back:   Symmetric, no curvature. ROM normal. No CVA tenderness. Lungs:   Clear to auscultation bilaterally. Heart:  Regular rate and rhythm, S1, S2 normal, no murmur, click, rub or gallop. Abdomen:   Soft, non-tender. Bowel sounds normal. No masses,  No organomegaly. Extremities: Extremities normal, atraumatic, no cyanosis or edema. Pulses: 2+ and symmetric all extremities. Skin: Skin color, texture, turgor normal. No rashes or lesions   Lymph nodes: Cervical, supraclavicular, and axillary nodes normal.   Neurologic: CNII-XII intact. Normal strength, sensation and reflexes throughout. Recent Results (from the past 24 hour(s))   CBC WITH AUTOMATED DIFF    Collection Time: 07/24/22 11:33 AM   Result Value Ref Range    WBC 10.5 4.1 - 11.1 K/uL    RBC 5.99 (H) 4.10 - 5.70 M/uL    HGB 13.2 12.1 - 17.0 g/dL    HCT 42.2 36.6 - 50.3 %    MCV 70.5 (L) 80.0 - 99.0 FL    MCH 22.0 (L) 26.0 - 34.0 PG    MCHC 31.3 30.0 - 36.5 g/dL    RDW 13.1 11.5 - 14.5 %    PLATELET 306 252 - 407 K/uL    MPV 11.9 8.9 - 12.9 FL    NRBC 0.0 0.0  WBC    ABSOLUTE NRBC 0.00 0.00 - 0.01 K/uL    NEUTROPHILS 76 (H) 32 - 75 %    LYMPHOCYTES 15 12 - 49 %    MONOCYTES 9 5 - 13 %    EOSINOPHILS 0 0 - 7 %    BASOPHILS 0 0 - 1 %    IMMATURE GRANULOCYTES 0 0 - 0.5 %    ABS. NEUTROPHILS 8.0 1.8 - 8.0 K/UL    ABS.  LYMPHOCYTES 1.6 0.8 - 3.5 K/UL    ABS. MONOCYTES 0.9 0.0 - 1.0 K/UL    ABS. EOSINOPHILS 0.0 0.0 - 0.4 K/UL    ABS. BASOPHILS 0.0 0.0 - 0.1 K/UL    ABS. IMM. GRANS. 0.0 0.00 - 0.04 K/UL    DF AUTOMATED     METABOLIC PANEL, COMPREHENSIVE    Collection Time: 07/24/22 11:33 AM   Result Value Ref Range    Sodium 140 136 - 145 mmol/L    Potassium 3.8 3.5 - 5.1 mmol/L    Chloride 107 97 - 108 mmol/L    CO2 26 21 - 32 mmol/L    Anion gap 7 5 - 15 mmol/L    Glucose 85 65 - 100 mg/dL    BUN 13 6 - 20 mg/dL    Creatinine 0.62 (L) 0.70 - 1.30 mg/dL    BUN/Creatinine ratio 21 (H) 12 - 20      GFR est AA >60 >60 ml/min/1.73m2    GFR est non-AA >60 >60 ml/min/1.73m2    Calcium 10.3 (H) 8.5 - 10.1 mg/dL    Bilirubin, total 0.6 0.2 - 1.0 mg/dL    AST (SGOT) 28 15 - 37 U/L    ALT (SGPT) 22 12 - 78 U/L    Alk.  phosphatase 65 45 - 117 U/L    Protein, total 8.5 (H) 6.4 - 8.2 g/dL    Albumin 4.4 3.5 - 5.0 g/dL    Globulin 4.1 (H) 2.0 - 4.0 g/dL    A-G Ratio 1.1 1.1 - 2.2     ETHYL ALCOHOL    Collection Time: 07/24/22 11:33 AM   Result Value Ref Range    ALCOHOL(ETHYL),SERUM <10 <10 mg/dL   COVID-19 WITH INFLUENZA A/B    Collection Time: 07/24/22 11:33 AM   Result Value Ref Range    SARS-CoV-2 by PCR DETECTED (A) Not Detected      Influenza A by PCR Not Detected Not Detected      Influenza B by PCR Not Detected Not Detected     VALPROIC ACID    Collection Time: 07/24/22 11:33 AM   Result Value Ref Range    Valproic acid 39 (L) 50 - 100 ug/mL   URINALYSIS W/ REFLEX CULTURE    Collection Time: 07/25/22  5:51 AM    Specimen: Urine   Result Value Ref Range    Color Dark Yellow      Appearance Turbid (A) Clear      Specific gravity 1.030 1.003 - 1.030      pH (UA) 5.0 5.0 - 8.0      Protein 100 (A) Negative mg/dL    Glucose Negative Negative mg/dL    Ketone 20 (A) Negative mg/dL    Bilirubin Negative Negative      Blood Small (A) Negative      Urobilinogen 2.0 (H) 0.1 - 1.0 EU/dL    Nitrites Negative Negative      Leukocyte Esterase Trace (A) Negative WBC 5-10 0 - 4 /hpf    RBC 0-5 0 - 5 /hpf    Bacteria Negative Negative /hpf    UA:UC IF INDICATED Culture not indicated by UA result Culture not indicated by UA result      Mucus 2+ (A) Negative /lpf   DRUG SCREEN, URINE    Collection Time: 07/25/22  5:51 AM   Result Value Ref Range    AMPHETAMINES Negative Negative      BARBITURATES Negative Negative      BENZODIAZEPINES Negative Negative      COCAINE Negative Negative      METHADONE Negative Negative      OPIATES Negative Negative      PCP(PHENCYCLIDINE) Negative Negative      THC (TH-CANNABINOL) Negative Negative      Drug screen comment        This test is a screen for drugs of abuse in a medical setting only (i.e., they are unconfirmed results and as such must not be used for non-medical purposes, e.g.,employment testing, legal testing). Due to its inherent nature, false positive (FP) and false negative (FN) results may be obtained. Therefore, if necessary for medical care, recommend confirmation of positive findings by GC/MS.        COVID-19 positive saturating well on room air  Psychosis acute        Plan:     Psychiatric consult for psychosis placed on low-dose steroids and antibiotics    Signed By: Toby Marcial MD     July 25, 2022

## 2022-07-25 NOTE — ED NOTES
Received pt asleep in psych proof room. Pt quiet and all restraints removed. Provider notified and order to d/c given. Pt stated that he was tired and went back asleep. Constant observer at bedside along with officer. Pt does have left arm handcuffed to stretcher. Will continue to observe for any changes.

## 2022-07-26 LAB
25(OH)D3 SERPL-MCNC: 10.1 NG/ML (ref 30–100)
PROCALCITONIN SERPL-MCNC: <0.05 NG/ML

## 2022-07-26 PROCEDURE — 74011250636 HC RX REV CODE- 250/636: Performed by: PSYCHIATRY & NEUROLOGY

## 2022-07-26 PROCEDURE — 74011250636 HC RX REV CODE- 250/636: Performed by: INTERNAL MEDICINE

## 2022-07-26 PROCEDURE — 65270000029 HC RM PRIVATE

## 2022-07-26 PROCEDURE — 74011250637 HC RX REV CODE- 250/637: Performed by: INTERNAL MEDICINE

## 2022-07-26 PROCEDURE — 36415 COLL VENOUS BLD VENIPUNCTURE: CPT

## 2022-07-26 PROCEDURE — 82306 VITAMIN D 25 HYDROXY: CPT

## 2022-07-26 PROCEDURE — 84145 PROCALCITONIN (PCT): CPT

## 2022-07-26 PROCEDURE — 74011250637 HC RX REV CODE- 250/637: Performed by: PSYCHIATRY & NEUROLOGY

## 2022-07-26 PROCEDURE — 74011250637 HC RX REV CODE- 250/637: Performed by: PHYSICIAN ASSISTANT

## 2022-07-26 RX ORDER — DIAZEPAM 10 MG/2ML
5 INJECTION INTRAMUSCULAR
Status: DISCONTINUED | OUTPATIENT
Start: 2022-07-26 | End: 2022-07-28

## 2022-07-26 RX ADMIN — QUETIAPINE FUMARATE 50 MG: 25 TABLET ORAL at 08:47

## 2022-07-26 RX ADMIN — HALOPERIDOL LACTATE 5 MG: 5 INJECTION, SOLUTION INTRAMUSCULAR at 03:59

## 2022-07-26 RX ADMIN — DIPHENHYDRAMINE HYDROCHLORIDE 50 MG: 50 INJECTION, SOLUTION INTRAMUSCULAR; INTRAVENOUS at 03:58

## 2022-07-26 RX ADMIN — AMOXICILLIN AND CLAVULANATE POTASSIUM 1 TABLET: 500; 125 TABLET, FILM COATED ORAL at 18:08

## 2022-07-26 RX ADMIN — ENOXAPARIN SODIUM 30 MG: 100 INJECTION SUBCUTANEOUS at 08:47

## 2022-07-26 RX ADMIN — VALPROIC ACID 1000 MG: 250 SOLUTION ORAL at 20:41

## 2022-07-26 RX ADMIN — DEXAMETHASONE 6 MG: 4 TABLET ORAL at 08:47

## 2022-07-26 RX ADMIN — QUETIAPINE FUMARATE 50 MG: 25 TABLET ORAL at 20:41

## 2022-07-26 RX ADMIN — QUETIAPINE FUMARATE 50 MG: 25 TABLET ORAL at 15:27

## 2022-07-26 RX ADMIN — VALPROIC ACID 1000 MG: 250 SOLUTION ORAL at 08:47

## 2022-07-26 RX ADMIN — AMOXICILLIN AND CLAVULANATE POTASSIUM 1 TABLET: 500; 125 TABLET, FILM COATED ORAL at 15:27

## 2022-07-26 RX ADMIN — AMOXICILLIN AND CLAVULANATE POTASSIUM 1 TABLET: 500; 125 TABLET, FILM COATED ORAL at 08:47

## 2022-07-26 RX ADMIN — ENOXAPARIN SODIUM 30 MG: 100 INJECTION SUBCUTANEOUS at 20:40

## 2022-07-26 NOTE — BSMART NOTE
Charge nurse, Sofía Garza, contacted nursing supervisor in reference to patient's bed assignment. Nursing supervisor states that the patient cannot go to medical floor yet because there is not a 1:1 sitter available at this time. Will follow up as needed.

## 2022-07-26 NOTE — BSMART NOTE
This writer spoke with pts mother. She is aware that pt has been moved to a medical room. She would like to be involved in pts hearing 7/27/2022. She is aware that it would be via phone.   This writer will inform case mgmt that she can be reached via phone at [phone]

## 2022-07-26 NOTE — PROGRESS NOTES
Progress Note    Patient: Clara Zavala MRN: [id number]  SSN: [id number]    YOB: 1995  Age: 32 y.o. Sex: male      Admit Date: 7/24/2022    LOS: 0 days     Subjective:       32years old with psychosis COVID-19 on room air    Objective:     Vitals:    07/25/22 0113 07/26/22 0704 07/26/22 0854 07/26/22 1118   BP: 106/60 (!) 96/55 119/85 119/85   Pulse: 64 91 85 85   Resp: 14 18 18   Temp: 98 °F (36.7 °C) 98 °F (36.7 °C) 98.2 °F (36.8 °C) 98.2 °F (36.8 °C)   SpO2: 98% 98% 98%    Weight:       Height:            Intake and Output:  Current Shift: No intake/output data recorded. Last three shifts: No intake/output data recorded. Physical Exam:   General:  Alert, cooperative, no distress, appears stated age. Eyes:  Conjunctivae/corneas clear. PERRL, EOMs intact. Fundi benign   Ears:  Normal TMs and external ear canals both ears. Nose: Nares normal. Septum midline. Mucosa normal. No drainage or sinus tenderness. Mouth/Throat: Lips, mucosa, and tongue normal. Teeth and gums normal.   Neck: Supple, symmetrical, trachea midline, no adenopathy, thyroid: no enlargment/tenderness/nodules, no carotid bruit and no JVD. Back:   Symmetric, no curvature. ROM normal. No CVA tenderness. Lungs:   Clear to auscultation bilaterally. Heart:  Regular rate and rhythm, S1, S2 normal, no murmur, click, rub or gallop. Abdomen:   Soft, non-tender. Bowel sounds normal. No masses,  No organomegaly. Extremities: Extremities normal, atraumatic, no cyanosis or edema. Pulses: 2+ and symmetric all extremities. Skin: Skin color, texture, turgor normal. No rashes or lesions   Lymph nodes: Cervical, supraclavicular, and axillary nodes normal.   Neurologic: CNII-XII intact. Normal strength, sensation and reflexes throughout. Lab/Data Review: All lab results for the last 24 hours reviewed.    Recent Results (from the past 24 hour(s))   PROCALCITONIN    Collection Time: 07/26/22  8:21 AM   Result Value Ref Range    Procalcitonin <0.05 (H) 0 ng/mL           Assessment:     Active Problems:    Acute psychosis ([address] (7/25/2022)      COVID-19 (7/25/2022)        Plan:     Continue present treatment  on room air  Nursing report of small seizure patient is on one-to-one  EEG and neurology consult    Signed By: Michel Vogt MD     July 26, 2022

## 2022-07-26 NOTE — BH NOTES
Recreational Therapy    Provided pt with education handout on developing a thought record to challenge negative thoughts and provided a leisure packet with aki

## 2022-07-26 NOTE — BSMART NOTE
Patient ran out of room, down hallway. Patient yelling while running throughout ER. Multiple ER staff intervened and able to escort patient back to his room. In room, patient tearful and apologizing for running out of room. Patient repeatedly states \"I want to go home. \" Attempted to console patient and informed him that he would be going upstairs at some point today. Patient received PRN IM Benadryl and Haldol.

## 2022-07-26 NOTE — BSMART NOTE
TDO change of facility paperwork faxed from Luz Guillory at [place] [id number] and given to officer sitting with patient. Patient uncuffed from hospital bed and ambulated to restroom. Patient oriented to person and place. Patient ambulated back to his ER room with redirection. Awaiting bed assignment.
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
## 2022-07-26 NOTE — PROGRESS NOTES
Reason for Admission:  psychosis                      RUR Score:          10%           Plan for utilizing home health:      N/A     PCP: First and Last name:  None     Name of Practice:    Are you a current patient: Yes/No:    Approximate date of last visit:    Can you participate in a virtual visit with your PCP:                     Current Advanced Directive/Advance Care Plan: Full Code      Healthcare Decision Maker:   Click here to complete [address] including selection of the Healthcare Decision Maker Relationship (ie \"Primary\")                             Transition of Care Plan:                      Patient is currently here under TDO. He is being followed by Tomeka Easley. CM will follow for any needs.

## 2022-07-27 LAB
PROLACTIN SERPL-MCNC: 17.7 NG/ML
TSH SERPL DL<=0.05 MIU/L-ACNC: 1.68 UIU/ML (ref 0.36–3.74)

## 2022-07-27 PROCEDURE — 74011250637 HC RX REV CODE- 250/637: Performed by: PSYCHIATRY & NEUROLOGY

## 2022-07-27 PROCEDURE — 74011250637 HC RX REV CODE- 250/637: Performed by: PHYSICIAN ASSISTANT

## 2022-07-27 PROCEDURE — 84443 ASSAY THYROID STIM HORMONE: CPT

## 2022-07-27 PROCEDURE — 84146 ASSAY OF PROLACTIN: CPT

## 2022-07-27 PROCEDURE — 74011250636 HC RX REV CODE- 250/636: Performed by: INTERNAL MEDICINE

## 2022-07-27 PROCEDURE — 36415 COLL VENOUS BLD VENIPUNCTURE: CPT

## 2022-07-27 PROCEDURE — 74011250637 HC RX REV CODE- 250/637: Performed by: INTERNAL MEDICINE

## 2022-07-27 PROCEDURE — 65270000029 HC RM PRIVATE

## 2022-07-27 RX ADMIN — VALPROIC ACID 1000 MG: 250 SOLUTION ORAL at 20:03

## 2022-07-27 RX ADMIN — QUETIAPINE FUMARATE 50 MG: 25 TABLET ORAL at 20:04

## 2022-07-27 RX ADMIN — VALPROIC ACID 1000 MG: 250 SOLUTION ORAL at 09:18

## 2022-07-27 RX ADMIN — AMOXICILLIN AND CLAVULANATE POTASSIUM 1 TABLET: 500; 125 TABLET, FILM COATED ORAL at 12:44

## 2022-07-27 RX ADMIN — DEXAMETHASONE 6 MG: 4 TABLET ORAL at 09:18

## 2022-07-27 RX ADMIN — AMOXICILLIN AND CLAVULANATE POTASSIUM 1 TABLET: 500; 125 TABLET, FILM COATED ORAL at 09:18

## 2022-07-27 RX ADMIN — ENOXAPARIN SODIUM 30 MG: 100 INJECTION SUBCUTANEOUS at 09:18

## 2022-07-27 RX ADMIN — QUETIAPINE FUMARATE 50 MG: 25 TABLET ORAL at 09:18

## 2022-07-27 RX ADMIN — QUETIAPINE FUMARATE 50 MG: 25 TABLET ORAL at 16:39

## 2022-07-27 RX ADMIN — AMOXICILLIN AND CLAVULANATE POTASSIUM 1 TABLET: 500; 125 TABLET, FILM COATED ORAL at 16:38

## 2022-07-27 RX ADMIN — ENOXAPARIN SODIUM 30 MG: 100 INJECTION SUBCUTANEOUS at 20:03

## 2022-07-27 NOTE — PROGRESS NOTES
Problem: Depressed Mood (Adult/Pediatric)  Goal: *STG: Verbalizes anger, guilt, and other feelings in a constructive manor  Outcome: Progressing Towards Goal     Problem: Depressed Mood (Adult/Pediatric)  Goal: *STG: Demonstrates reduction in symptoms and increase in insight into coping skills/future focused  Outcome: Progressing Towards Goal     Problem: Depressed Mood (Adult/Pediatric)  Goal: *STG: Remains safe in hospital  Outcome: Progressing Towards Goal     Problem: Depressed Mood (Adult/Pediatric)  Goal: *LTG: Understands illness and can identify signs of relapse  Outcome: Progressing Towards Goal

## 2022-07-27 NOTE — BH NOTES
Pt assessed in room 582 on [address] [place] by writer. Pt sitting on side of bed, watching TV. Pt dressed in green gown, appearance is neat. Pt is alert and oriented to person and place, but did not appear to understand his current situation or that he has his TDO hearing today. Pt speech is garbled and soft. Asked writer to take him home, and repeated his desire to go home several times. Discussed possibility of his being able to return to his parents at d/c. Pt said he prefers to go home with parents, then remain here in hospital. Denies SI/HI at this time. Rated depression and anxiety as \"yes\". Pt reports eating and taking prescribed medications. When asked if needed anything, he stated \"I just need to get a ride home, my parents are working and cannot pick me. \"Sitter shared that patient was seen by Dr. Donita Hernandez, neuro this morning and was unclear of the reason for consult. Per chart, pt has appt with neurologist, Mekhi Mahajan on 8/9/22.

## 2022-07-27 NOTE — BH NOTES
Nurse Note:    Patient observed resting quietly in bed; sitter observed monitoring for safety. Patient presents cooperative and guarded. Patient maintained eye contact during the assessment and answered each question with \"I'm ready to go home\". Patient denies SI, HI, and auditory hallucinations. Reports visual hallucinations, but when asked to describe them patient replied, \"vaguely\". Patient denies anxiety and depression and stated, \"I just want to go home\". Patient reports eating and sleeping. Denies pain and discomfort. Patient continues to rest quietly; sitter continues to monitor for safety.

## 2022-07-27 NOTE — H&P
[facility] HISTORY AND PHYSICAL    Name:  Damien Mccray  MR#:  [id number]  :  1995  ACCOUNT #:  [de-identified]  ADMIT DATE:  2022      Date seen:  2022 at 2 p.m. Please make reference to my psych consult that was made on 2022 in emergency room. HISTORY OF PRESENT ILLNESS:  This is a 71-year-old single  male patient, who was admitted initially to ED under ECO converted to TDO, admitted to medical floor under Psychiatric services as he is COVID positive. At this time, the patient is drowsy from seizures, and as I have  considerable information, new information also put together gentleman has a history of learning disability, lives with the mother and stepfather and had seizures, takes Depakote 1000 mg twice a day. Apparently, he was taking medication inconsistently. His valproic acid level was 39 on 2022. Increasingly paranoid at home thinking that stepfather is going to kill him and getting worse. Complaining of frustration with not able to have a job, having to put up with the rules. He want to live on his own, but he cannot. Emotionally, mentally, and financially, he cannot do that. Currently, not getting any treatment. Again, the patient was placed on Seroquel 50 mg twice a day. Mental status could not be done because the patient is drowsy and confused, resting in a postictal state. We will do a mental status at a latter time. ALLERGIES TO MEDICATIONS:  IN THE CHART. DIAGNOSES:  Intellectual disability moderate, secondary to learning disability with behavioral problems. Seizure disorder. Postictal behavior. The patient needs inpatient level of care as he was physically aggressive, paranoid, and delusional and also is under TDO. A hearing will be done on 2022. Mom was notified. DISPOSITION:  Close observation, one-to-one staff, and seizure precautions. Medical consult with Dr. Winifred Lamebrt. Continue Depakote 1000 mg twice a day and Seroquel 50 mg twice a day, p.r.n. medications for agitation. LENGTH OF STAY:  5-7 days. CRITERIA FOR DISCHARGE:  Free of delusional, fearful thinking, calm behavior with stable neurovegetative functioning. Return back to the family. Labs were already reviewed. They are in the chart.       Kassie Noland MD      [id number]  D:  07/27/2022 0:25  T:  07/27/2022 5:04  JOB #:  [id number]

## 2022-07-27 NOTE — PROGRESS NOTES
CM reviewed chart. Patient is being followed by [address] [place] [address]. CM will follow for any needs.

## 2022-07-27 NOTE — PROGRESS NOTES
Problem: Isolation Precautions - Risk of Spread of Infection  Goal: Prevent transmission of infectious organism to others  Outcome: Progressing Towards Goal     Problem: Nutrition Deficits  Goal: Optimize nutrtional status  Outcome: Progressing Towards Goal     Problem: Fatigue  Goal: Verbalize increase energy and improved vitality  Outcome: Progressing Towards Goal
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
## 2022-07-27 NOTE — BH NOTES
PSA PART II ADDITIONAL INFORMATION        Access To [facility]: No    Substance Use: NO    Last Use:     Type of Substance: no substance use    Frequency of Use:     Request to See : NO    If yes, notified : NO    Guardian:NO    Guardian Contact:    Release of Information Signed: NO    Release of Information Signed For:

## 2022-07-27 NOTE — PROGRESS NOTES
Problem: Airway Clearance - Ineffective  Goal: Achieve or maintain patent airway  Outcome: Progressing Towards Goal     Problem: Gas Exchange - Impaired  Goal: Absence of hypoxia  Outcome: Progressing Towards Goal  Goal: Promote optimal lung function  Outcome: Progressing Towards Goal     Problem:  Body Temperature -  Risk of, Imbalanced  Goal: Ability to maintain a body temperature within defined limits  Outcome: Progressing Towards Goal  Goal: Will regain or maintain usual level of consciousness  Outcome: Progressing Towards Goal  Goal: Complications related to the disease process, condition or treatment will be avoided or minimized  Outcome: Progressing Towards Goal

## 2022-07-27 NOTE — PROGRESS NOTES
Progress Note    Patient: Kamryn Stauffer MRN: [id number]  SSN: [id number]    YOB: 1995  Age: 32 y.o. Sex: male      Admit Date: 7/24/2022    LOS: 1 day     Subjective:     32 yrs Is old with COVID-pneumonia psychosis final schizophrenia bipolar    Objective:     Vitals:    07/26/22 1919 07/27/22 0700 07/27/22 0925 07/27/22 0927   BP: (!) 98/56 107/69 107/69    Pulse: 88 76 76    Resp: 17 18 18    Temp: 97.6 °F (36.4 °C) 98.6 °F (37 °C) 98.6 °F (37 °C)    SpO2: 98% 99%  99%   Weight:       Height:            Intake and Output:  Current Shift: No intake/output data recorded. Last three shifts: No intake/output data recorded. Physical Exam:   General:  Alert, cooperative, no distress, appears stated age. Eyes:  Conjunctivae/corneas clear. PERRL, EOMs intact. Fundi benign   Ears:  Normal TMs and external ear canals both ears. Nose: Nares normal. Septum midline. Mucosa normal. No drainage or sinus tenderness. Mouth/Throat: Lips, mucosa, and tongue normal. Teeth and gums normal.   Neck: Supple, symmetrical, trachea midline, no adenopathy, thyroid: no enlargment/tenderness/nodules, no carotid bruit and no JVD. Back:   Symmetric, no curvature. ROM normal. No CVA tenderness. Lungs:   Clear to auscultation bilaterally. Heart:  Regular rate and rhythm, S1, S2 normal, no murmur, click, rub or gallop. Abdomen:   Soft, non-tender. Bowel sounds normal. No masses,  No organomegaly. Extremities: Extremities normal, atraumatic, no cyanosis or edema. Pulses: 2+ and symmetric all extremities. Skin: Skin color, texture, turgor normal. No rashes or lesions   Lymph nodes: Cervical, supraclavicular, and axillary nodes normal.   Neurologic: CNII-XII intact. Normal strength, sensation and reflexes throughout. Lab/Data Review: All lab results for the last 24 hours reviewed.      Recent Results (from the past 24 hour(s))   TSH 3RD GENERATION    Collection Time: 07/27/22 11:03 AM   Result Value Ref Range    TSH 1.68 0.36 - 3.74 uIU/mL         Assessment:     Active Problems:    Acute psychosis ([address] (7/25/2022)      COVID-19 (7/25/2022)        Plan:     Present treatment on Depakote and Seroquel    Signed By: John Levin MD     July 27, 2022

## 2022-07-27 NOTE — PROGRESS NOTES
Bedside shift change report given to [address] [place] [address]) by Delmy lorenzo). Report included the following information SBAR, Intake/Output, MAR, Recent Results, and Med Rec Status.

## 2022-07-27 NOTE — CONSULTS
NEURO CONSULT      REASON FOR ADMISSION:  Status changes  COVID-positive      HISTORY:  Mr. Nayely Gongora is 32years old with a history of developmental delay who is admitted for psychotic episode and consulted to neurology mental status changes apparently. Patient is being followed by psychiatry      ROS:    General:                     No fever, no chills, no sweats, no generalized weakness, no weight loss/gain,                                       No loss of appetite   Eyes:                           No blurred vision, no eye pain, no loss of vision, no double vision  ENT:                            rhinorrhea, no pharyngitis   Respiratory:               No cough, no sputum production, no SOB, no RATLIFF, no wheezing, no pleuritic pain   Cardiology:                No chest pain, no palpitations, no orthopnea, no PND, no edema, no syncope   Gastrointestinal:       No abdominal pain , no N/V, no diarrhea, no dysphagia, no constipation, no bleeding   Genitourinary:           frequency, no urgency, no dysuria, no hematuria, no incontinence   Muskuloskeletal :      No arthralgia, no myalgia, no back pain  Hematology:              No easy bruising, no nose or gum bleeding, no lymphadenopathy   Dermatological:         No rash, no ulceration, no pruritis, no color change / jaundice  Endocrine:                 hot flashes or polydipsia   Neurological:             No headache, no dizziness, no confusion, no focal weakness, no paresthesia,                                      No Speech difficulties, no memory loss, no gait difficulty  Psychological:          No neelings of anxiety, no depression, no agitation      NEURO EXAM:    Mental status: Awake sitting up in bed. Responds to name mumbles. Follows one-step commands.     Cranial nerves: Cranial nerve exam intact    Motor exam: Motor exam intact    Sensory exam: Romberg is negative    Coordination: Coordination is fair    Gait and Station: Patient was not ambulated    ASSESSMENT:  Mental status changes likely secondary to inherent psychiatric disorder. Developmental delay  COVID disease      PLAN:  TSH  Prolactin  Will defer EEG now that the patient is actively COVID-positive.       ALLERGIES:    No Known Allergies    MEDS:      Current Facility-Administered Medications:     diazePAM (VALIUM) injection 5 mg, 5 mg, IntraVENous, Q6H PRN, Bryan Suarez MD    sodium chloride (NS) flush 5-40 mL, 5-40 mL, IntraVENous, Q8H, Bryan Suarez MD    sodium chloride (NS) flush 5-40 mL, 5-40 mL, IntraVENous, PRN, Bryan Rayo MD    acetaminophen (TYLENOL) tablet 650 mg, 650 mg, Oral, Q6H PRN **OR** acetaminophen (TYLENOL) suppository 650 mg, 650 mg, Rectal, Q6H PRN, Bryan Rayo MD    polyethylene glycol (MIRALAX) packet 17 g, 17 g, Oral, DAILY PRN, Bryan Rayo MD    enoxaparin (LOVENOX) injection 30 mg, 30 mg, SubCUTAneous, Q12H, Bryan Suarez MD, 30 mg at 07/26/22 2040    dexAMETHasone (DECADRON) tablet 6 mg, 6 mg, Oral, DAILY, Bryan Suarez MD, 6 mg at 07/26/22 0847    amoxicillin-clavulanate (AUGMENTIN) 500-125 mg per tablet 1 Tablet, 1 Tablet, Oral, TID WITH MEALS, Bryan Suarez MD, 1 Tablet at 07/26/22 1808    QUEtiapine (SEROquel) tablet 50 mg, 50 mg, Oral, TID, Elvia THAKUR MD, 50 mg at 07/26/22 2041    haloperidol lactate (HALDOL) injection 5 mg, 5 mg, IntraMUSCular, Q6H PRN, Moris Espino MD, 5 mg at 07/26/22 0359    diphenhydrAMINE (BENADRYL) injection 50 mg, 50 mg, IntraMUSCular, BID PRN, Elvia THAKUR MD, 50 mg at 07/26/22 0358    hydrOXYzine HCL (ATARAX) tablet 50 mg, 50 mg, Oral, TID PRN, Moris Espino MD    traZODone (DESYREL) tablet 50 mg, 50 mg, Oral, QHS PRN, Moris Espino MD, 50 mg at 07/25/22 6035    magnesium hydroxide (MILK OF MAGNESIA) 400 mg/5 mL oral suspension 30 mL, 30 mL, Oral, DAILY PRN, Wilbur Marcelo MD    valproic acid (as sodium salt) (DEPAKENE) 250 mg/5 mL (5 mL) oral solution 1,000 mg, 1,000 mg, Oral, BID, Darell Rhodes PA, 1,000 mg at 07/26/22 2041    LABS:  No results found for this or any previous visit (from the past 24 hour(s)).     Visit Vitals  /69 (BP 1 Location: Right upper arm, BP Patient Position: Lying)   Pulse 76   Temp 98.6 °F (37 °C)   Resp 18   Ht 6' (1.829 m)   Wt 113.4 kg (250 lb)   SpO2 99%   BMI 33.91 kg/m²       Imaging:  No orders to display

## 2022-07-28 PROCEDURE — 74011250636 HC RX REV CODE- 250/636: Performed by: INTERNAL MEDICINE

## 2022-07-28 PROCEDURE — 65270000029 HC RM PRIVATE

## 2022-07-28 PROCEDURE — 74011250637 HC RX REV CODE- 250/637: Performed by: PHYSICIAN ASSISTANT

## 2022-07-28 PROCEDURE — 74011250637 HC RX REV CODE- 250/637: Performed by: PSYCHIATRY & NEUROLOGY

## 2022-07-28 PROCEDURE — 74011250637 HC RX REV CODE- 250/637: Performed by: INTERNAL MEDICINE

## 2022-07-28 RX ORDER — MIDAZOLAM HYDROCHLORIDE 1 MG/ML
4 INJECTION, SOLUTION INTRAMUSCULAR; INTRAVENOUS
Status: DISCONTINUED | OUTPATIENT
Start: 2022-07-28 | End: 2022-08-03 | Stop reason: HOSPADM

## 2022-07-28 RX ADMIN — AMOXICILLIN AND CLAVULANATE POTASSIUM 1 TABLET: 500; 125 TABLET, FILM COATED ORAL at 12:28

## 2022-07-28 RX ADMIN — VALPROIC ACID 1000 MG: 250 SOLUTION ORAL at 18:18

## 2022-07-28 RX ADMIN — ENOXAPARIN SODIUM 30 MG: 100 INJECTION SUBCUTANEOUS at 09:31

## 2022-07-28 RX ADMIN — AMOXICILLIN AND CLAVULANATE POTASSIUM 1 TABLET: 500; 125 TABLET, FILM COATED ORAL at 09:31

## 2022-07-28 RX ADMIN — AMOXICILLIN AND CLAVULANATE POTASSIUM 1 TABLET: 500; 125 TABLET, FILM COATED ORAL at 18:18

## 2022-07-28 RX ADMIN — QUETIAPINE FUMARATE 50 MG: 25 TABLET ORAL at 18:18

## 2022-07-28 RX ADMIN — QUETIAPINE FUMARATE 50 MG: 25 TABLET ORAL at 09:31

## 2022-07-28 RX ADMIN — QUETIAPINE FUMARATE 50 MG: 25 TABLET ORAL at 21:18

## 2022-07-28 RX ADMIN — VALPROIC ACID 1000 MG: 250 SOLUTION ORAL at 09:32

## 2022-07-28 RX ADMIN — DEXAMETHASONE 6 MG: 4 TABLET ORAL at 09:30

## 2022-07-28 NOTE — BH NOTES
Pt assessed in room 582 on [id number] [facility] bed watching TV. Alert and oriented to person and place. Denies thoughts of self-harm or harm to others. Pt does not understand terms suicidal and homicidal. Denies hearing voices or \"seeing things other people don't see. \" Speech continues to be garbled. Affect and mood are flat and apathetic. Pt states he was told that he could be discharged on Friday. When asked if he had spoken to his parents and if they were agreeable to him returning home, he said he did not know. Pt closed his eyes and ignored writer several times during assessment, but did end the conversation with \"all I need to do is stay here one more day, then I can go home, right? \" Encouraged pt to ask Dr. Lissette Kahn and  specifically when they round on him today.

## 2022-07-28 NOTE — PROGRESS NOTES
Bedside shift change report given to [address] [place] [address]) by Leanna lorenzo). Report included the following information SBAR, Procedure Summary, Intake/Output, MAR, Recent Results, and Med Rec Status.

## 2022-07-28 NOTE — BH NOTES
Patient was calm, and cooperative. Unable to answer questions on a scale of 1-10 due to ID  When asked if he was thinking of hurting himself if couldn't give straight answer or yes or no and then when asked how he might hurt himself he stated \"by talking mean to my parents, being bad and not doing what they tell me\". So obviously he did not understand what it meant to hurt himself, or to have a plan or suicide. Pt was note to have a aluminum coke can on his bedside table tray and was removed by this nurse and informed the tech that [facility] patients should not have them near them. She agreed and disposed of can. Pt was left in the bed with 1:1 sitter resting quietly with mask on. He stated he was told he would be here until Friday because he had COVID but says he feels better. He asked this nurse if she drove and if she had her own car, which I answered yet. Then patient asked if nurse could take him home, and nurse explained why they could not happen. Pt said ok.

## 2022-07-28 NOTE — BH NOTES
Recreational Therapy    Provided pt with education handout on \"characteristics of healthy relationships\" and provided pt with leisure packet.

## 2022-07-28 NOTE — PROGRESS NOTES
Problem: Loneliness or Risk for Loneliness  Goal: Demonstrate positive use of time alone when socialization is not possible  Outcome: Progressing Towards Goal     Problem: Fatigue  Goal: Verbalize increase energy and improved vitality  Outcome: Progressing Towards Goal

## 2022-07-28 NOTE — PROGRESS NOTES
Problem: Depressed Mood (Adult/Pediatric)  Goal: *STG: Participates in treatment plan  Outcome: Progressing Towards Goal     Problem: Depressed Mood (Adult/Pediatric)  Goal: *STG: Verbalizes anger, guilt, and other feelings in a constructive manor  Outcome: Progressing Towards Goal     Problem: Depressed Mood (Adult/Pediatric)  Goal: *STG: Demonstrates reduction in symptoms and increase in insight into coping skills/future focused  Outcome: Progressing Towards Goal     Problem: Depressed Mood (Adult/Pediatric)  Goal: *STG: Complies with medication therapy  Outcome: Progressing Towards Goal

## 2022-07-28 NOTE — PROGRESS NOTES
NEURO PROGRESS NOTE        SUBJECTIVE:   Mental status changes  COVID-positive      EXAM:  Awake, sitting up in bed. There is a sitter in the room.   Responds verbally and oriented to location  No agitation no confusion this morning  Follows one-step commands  No new focality      ASSESSMENT/PLAN:  Current treatment continue    ALLERGIES:    No Known Allergies    MEDS:      Current Facility-Administered Medications:     diazePAM (VALIUM) injection 5 mg, 5 mg, IntraVENous, Q6H PRN, Bryan Suarez MD    sodium chloride (NS) flush 5-40 mL, 5-40 mL, IntraVENous, Q8H, Bryan Suarez MD    sodium chloride (NS) flush 5-40 mL, 5-40 mL, IntraVENous, PRN, Bryan Grullon MD    acetaminophen (TYLENOL) tablet 650 mg, 650 mg, Oral, Q6H PRN **OR** acetaminophen (TYLENOL) suppository 650 mg, 650 mg, Rectal, Q6H PRN, Bryan Suarez MD    polyethylene glycol (MIRALAX) packet 17 g, 17 g, Oral, DAILY PRN, Bryan Suarez MD    enoxaparin (LOVENOX) injection 30 mg, 30 mg, SubCUTAneous, Q12H, Bryan Suarez MD, 30 mg at 07/28/22 0931    dexAMETHasone (DECADRON) tablet 6 mg, 6 mg, Oral, DAILY, Bryan Suarez MD, 6 mg at 07/28/22 0930    amoxicillin-clavulanate (AUGMENTIN) 500-125 mg per tablet 1 Tablet, 1 Tablet, Oral, TID WITH MEALS, Jazzy ALEGRIA MD, 1 Tablet at 07/28/22 0931    QUEtiapine (SEROquel) tablet 50 mg, 50 mg, Oral, TID, Dione THAKUR MD, 50 mg at 07/28/22 0931    haloperidol lactate (HALDOL) injection 5 mg, 5 mg, IntraMUSCular, Q6H PRN, Roxanna Chiang MD, 5 mg at 07/26/22 0359    diphenhydrAMINE (BENADRYL) injection 50 mg, 50 mg, IntraMUSCular, BID PRN, Roxanna Chiang MD, 50 mg at 07/26/22 0358    hydrOXYzine HCL (ATARAX) tablet 50 mg, 50 mg, Oral, TID PRN, Roxanna Chiang MD    traZODone (DESYREL) tablet 50 mg, 50 mg, Oral, QHS PRN, Roxanna Chiang MD, 50 mg at 07/25/22 2448    magnesium hydroxide (MILK OF MAGNESIA) 400 mg/5 mL oral suspension 30 mL, 30 mL, Oral, DAILY PRN, Lotus Seth, Laverna Lesch, MD    valproic acid (as sodium salt) (DEPAKENE) 250 mg/5 mL (5 mL) oral solution 1,000 mg, 1,000 mg, Oral, BID, Darell Rhodes PA, 1,000 mg at 07/28/22 0932    LABS:  Recent Results (from the past 24 hour(s))   TSH 3RD GENERATION    Collection Time: 07/27/22 11:03 AM   Result Value Ref Range    TSH 1.68 0.36 - 3.74 uIU/mL   PROLACTIN    Collection Time: 07/27/22 11:03 AM   Result Value Ref Range    Prolactin 17.7 ng/mL       Visit Vitals  BP (!) 101/56 (BP 1 Location: Right upper arm, BP Patient Position: At rest)   Pulse 66   Temp 98.1 °F (36.7 °C)   Resp 18   Ht 6' (1.829 m)   Wt 113.4 kg (250 lb)   SpO2 100%   BMI 33.91 kg/m²       Imaging:  No orders to display

## 2022-07-28 NOTE — PROGRESS NOTES
CM reviewed chart. Patient is being followed by [facility]. CM will follow for any needs.

## 2022-07-28 NOTE — PROGRESS NOTES
Progress Note  Date:2022       Room:[facility]  Patient Name:Raymon Dunham     YOB: 1995     Age:26 y.o. Subjective    Subjective   Review of Systems  Objective         Vitals Last 24 Hours:  TEMPERATURE:  Temp  Av.3 °F (36.8 °C)  Min: 97.6 °F (36.4 °C)  Max: 98.6 °F (37 °C)  RESPIRATIONS RANGE: Resp  Av  Min: 18  Max: 18  PULSE OXIMETRY RANGE: SpO2  Av.8 %  Min: 98 %  Max: 99 %  PULSE RANGE: Pulse  Av.5  Min: 66  Max: 76  BLOOD PRESSURE RANGE: Systolic (27RRG), SBM:591 , Min:106 , JSD:328   ; Diastolic (99IIH), SXD:83, Min:66, Max:76    I/O (24Hr): No intake or output data in the 24 hours ending 22  Objective  Labs/Imaging/Diagnostics    Labs:  CBC:No results for input(s): WBC, RBC, HGB, HCT, MCV, RDW, PLT, HGBEXT, HCTEXT, PLTEXT in the last 72 hours. CHEMISTRIES:No results for input(s): NA, K, CL, CO2, BUN, CA, PHOS, MG in the last 72 hours. No lab exists for component: CREATININE, GLUCOSEPT/INR:No results for input(s): INR, INREXT in the last 72 hours. No lab exists for component: PROTIME  APTT:No results for input(s): APTT in the last 72 hours. LIVER PROFILE:No results for input(s): AST, ALT in the last 72 hours. No lab exists for component: Pamla Boot, ALKPHOS  Lab Results   Component Value Date/Time    ALT (SGPT) 22 2022 11:33 AM    AST (SGOT) 28 2022 11:33 AM    Alk. phosphatase 65 2022 11:33 AM    Bilirubin, total 0.6 2022 11:33 AM       Imaging Last 24 Hours:  No results found.   Assessment//Plan   Active Problems:    Acute psychosis ([address] (2022)      COVID-19 (2022)      Assessment & Plan patient case discussed in the treatment team this morning and he will be seen by the  for hearing today recommended a period of inpatient care stabilize clear his psychosis patient seen today in room 582 has a one-to-one staff patient sitting on the edge of the bed alert verbal polite all he wanted was go home he did acknowledge feeling that his stepfather fearful of stepfather on rules yet is still want to go home. It toned down his thoughts about stepfather tried to kill him but still holding onto the idea poor insight and judgment no agitation or aggression no seizures he was seen by Dr. J Carlos Palma EEG on hold because of his COVID-positive status. Prolactin level was 17.7 vital signs are stable poor insight poor judgment no side effect from medication.   Compliant with medication taking Seroquel 50 mg 3 times a day again we will further observe her on antipsychotic medications see whether this will clear up paranoia altogether in spite of paranoia still want to go home child spoke with me about clarity of the diagnosis basically he has a developmental delay with learning disability and intellectual disability with a behavior problem currently that psychosis paranoia fears stepfather is going to kill him and acting out under this delusion is a major issue along with the seizures continued inpatient level of care indicated as recommended the patient need inpatient level of care for psychosis paranoia and treatment to prevent act out thank you vital signs are stable    Electronically signed by Heather Valadez MD on 7/27/2022 at 10:32 PM

## 2022-07-28 NOTE — PROGRESS NOTES
Progress Note    Patient: Kayla Gooden MRN: [id number]  SSN: [id number]    YOB: 1995  Age: 32 y.o. Sex: male      Admit Date: 7/24/2022    LOS: 2 days     Subjective:     Patient is sleepy has one-to-one    Objective:     Vitals:    07/27/22 1912 07/28/22 0127 07/28/22 0816 07/28/22 1326   BP: 106/66 101/61 (!) 101/56 123/71   Pulse: 66 67 66 76   Resp: 18 18 18 18   Temp: 97.6 °F (36.4 °C) 97.3 °F (36.3 °C) 98.1 °F (36.7 °C) 98.2 °F (36.8 °C)   SpO2: 98% 98% 100% 96%   Weight:       Height:            Intake and Output:  Current Shift: No intake/output data recorded. Last three shifts: 07/26 1901 - 07/28 0700  In: 480 [P.O.:480]  Out: -     Physical Exam:   General:  Alert, cooperative, no distress, appears stated age. Eyes:  Conjunctivae/corneas clear. PERRL, EOMs intact. Fundi benign   Ears:  Normal TMs and external ear canals both ears. Nose: Nares normal. Septum midline. Mucosa normal. No drainage or sinus tenderness. Mouth/Throat: Lips, mucosa, and tongue normal. Teeth and gums normal.   Neck: Supple, symmetrical, trachea midline, no adenopathy, thyroid: no enlargment/tenderness/nodules, no carotid bruit and no JVD. Back:   Symmetric, no curvature. ROM normal. No CVA tenderness. Lungs:   Clear to auscultation bilaterally. Heart:  Regular rate and rhythm, S1, S2 normal, no murmur, click, rub or gallop. Abdomen:   Soft, non-tender. Bowel sounds normal. No masses,  No organomegaly. Extremities: Extremities normal, atraumatic, no cyanosis or edema. Pulses: 2+ and symmetric all extremities. Skin: Skin color, texture, turgor normal. No rashes or lesions   Lymph nodes: Cervical, supraclavicular, and axillary nodes normal.   Neurologic: CNII-XII intact. Normal strength, sensation and reflexes throughout. Lab/Data Review: All lab results for the last 24 hours reviewed.      No results found for this or any previous visit (from the past 24 hour(s)).       Assessment:     Active Problems:    Acute psychosis ([facility] (7/25/2022)      COVID-19 (7/25/2022)      Psychosis  Plan:     Patient remains on room air continue to monitor    Signed By: Day Eastman MD     July 28, 2022

## 2022-07-28 NOTE — BH NOTES
Behavioral Health Treatment Team Note     Patient goal(s) for today: \"to go home\"  Treatment team focus/goals: Continue medication management, group therapy, and provide a safe discharge    Progress note: Pt presented with a flat affect and congruent mood. Pt denied any SI/HI/AVH at the time and was oriented x3. Pts speech was slow and muffled. Pts reported that he would like to start working so he can live on his own. Pt shared that he talked with his mother yesterday and she said that he can go home after he is discharged. Pt signed a release of information for his mother David Luna). Pt would like to have outside resources to help with case management. An inpatient level of care is needed to stabilize pt and provide a safe discharge. Collateral call with mother David Luna) [phone] spoke with pts mother and she shared that she is not comfortable with pt returning home if he is not willing to take his medication properly since step-father is a trigger. Mother reported that pt is calm and quiet when he is taking his medication but \"rarely\" takes it consistently. She said that pt does not currently have a therapist or doctor and would like to connect him with one before discharging. Writer spoke about [facility] and she believes that would help her son as well. Mother reported that pt speaks inappropriately to others and that is why it is difficult for pt to keep a job. LOS:  2  Expected LOS: TDO until 08.05.22    Insurance info/prescription coverage:  [facility] MEDICAID/[facility] COMMUNITY PLAN [facility]  Date of last family contact:  7.28.22  Family requesting physician contact today:  no  Discharge plan: Will be coordinated prior to discharge  Guns in the home:  no   Outpatient provider(s):   Will be coordinated prior to discharge    Participating treatment team members: Kayla Gooden, * (assigned SW), [facility], [facility]

## 2022-07-29 PROCEDURE — 74011250637 HC RX REV CODE- 250/637: Performed by: PSYCHIATRY & NEUROLOGY

## 2022-07-29 PROCEDURE — 74011250637 HC RX REV CODE- 250/637: Performed by: INTERNAL MEDICINE

## 2022-07-29 PROCEDURE — 74011000250 HC RX REV CODE- 250: Performed by: INTERNAL MEDICINE

## 2022-07-29 PROCEDURE — 74011250636 HC RX REV CODE- 250/636: Performed by: INTERNAL MEDICINE

## 2022-07-29 PROCEDURE — 65270000029 HC RM PRIVATE

## 2022-07-29 RX ADMIN — AMOXICILLIN AND CLAVULANATE POTASSIUM 1 TABLET: 500; 125 TABLET, FILM COATED ORAL at 08:54

## 2022-07-29 RX ADMIN — QUETIAPINE FUMARATE 50 MG: 25 TABLET ORAL at 16:03

## 2022-07-29 RX ADMIN — SODIUM CHLORIDE, PRESERVATIVE FREE 10 ML: 5 INJECTION INTRAVENOUS at 05:10

## 2022-07-29 RX ADMIN — ENOXAPARIN SODIUM 30 MG: 100 INJECTION SUBCUTANEOUS at 08:54

## 2022-07-29 RX ADMIN — QUETIAPINE FUMARATE 50 MG: 25 TABLET ORAL at 08:55

## 2022-07-29 RX ADMIN — DEXAMETHASONE 6 MG: 4 TABLET ORAL at 08:55

## 2022-07-29 RX ADMIN — QUETIAPINE FUMARATE 50 MG: 25 TABLET ORAL at 19:51

## 2022-07-29 RX ADMIN — AMOXICILLIN AND CLAVULANATE POTASSIUM 1 TABLET: 500; 125 TABLET, FILM COATED ORAL at 16:03

## 2022-07-29 RX ADMIN — AMOXICILLIN AND CLAVULANATE POTASSIUM 1 TABLET: 500; 125 TABLET, FILM COATED ORAL at 12:02

## 2022-07-29 RX ADMIN — SODIUM CHLORIDE, PRESERVATIVE FREE 10 ML: 5 INJECTION INTRAVENOUS at 16:09

## 2022-07-29 RX ADMIN — ENOXAPARIN SODIUM 30 MG: 100 INJECTION SUBCUTANEOUS at 19:51

## 2022-07-29 NOTE — PROGRESS NOTES
Problem: Airway Clearance - Ineffective  Goal: Achieve or maintain patent airway  Outcome: Progressing Towards Goal     Problem: Gas Exchange - Impaired  Goal: Absence of hypoxia  Outcome: Progressing Towards Goal  Goal: Promote optimal lung function  Outcome: Progressing Towards Goal     Problem: Breathing Pattern - Ineffective  Goal: Ability to achieve and maintain a regular respiratory rate  Outcome: Progressing Towards Goal     Problem:  Body Temperature -  Risk of, Imbalanced  Goal: Ability to maintain a body temperature within defined limits  Outcome: Progressing Towards Goal  Goal: Will regain or maintain usual level of consciousness  Outcome: Progressing Towards Goal  Goal: Complications related to the disease process, condition or treatment will be avoided or minimized  Outcome: Progressing Towards Goal     Problem: Nutrition Deficits  Goal: Optimize nutrtional status  Outcome: Progressing Towards Goal     Problem: Risk for Fluid Volume Deficit  Goal: Maintain normal heart rhythm  Outcome: Progressing Towards Goal  Goal: Maintain absence of muscle cramping  Outcome: Progressing Towards Goal  Goal: Maintain normal serum potassium, sodium, calcium, phosphorus, and pH  Outcome: Progressing Towards Goal     Problem: Fatigue  Goal: Verbalize increase energy and improved vitality  Outcome: Progressing Towards Goal     Problem: Patient Education: Go to Patient Education Activity  Goal: Patient/Family Education  Outcome: Progressing Towards Goal     Problem: Depressed Mood (Adult/Pediatric)  Goal: *STG: Participates in treatment plan  Outcome: Progressing Towards Goal  Goal: *STG: Participates in 1:1 therapy sessions  Outcome: Progressing Towards Goal  Goal: *STG: Verbalizes anger, guilt, and other feelings in a constructive manor  Outcome: Progressing Towards Goal  Goal: *STG: Attends activities and groups  Outcome: Progressing Towards Goal  Goal: *STG: Demonstrates reduction in symptoms and increase in insight into coping skills/future focused  Outcome: Progressing Towards Goal  Goal: *STG: Remains safe in hospital  Outcome: Progressing Towards Goal  Goal: *STG: Complies with medication therapy  Outcome: Progressing Towards Goal  Goal: *LTG: Returns to previous level of functioning and participates with after care plan  Outcome: Progressing Towards Goal  Goal: *LTG: Understands illness and can identify signs of relapse  Outcome: Progressing Towards Goal  Goal: Interventions  Outcome: Progressing Towards Goal     Problem: Falls - Risk of  Goal: *Absence of Falls  Description: Document Avinash Fall Risk and appropriate interventions in the flowsheet.   Outcome: Progressing Towards Goal  Note: Fall Risk Interventions:            Medication Interventions: Teach patient to arise slowly

## 2022-07-29 NOTE — PROGRESS NOTES
CM reviewed chart. Patient continues to be followed by behavioral health. CM will continue to follow for any needs.

## 2022-07-29 NOTE — PROGRESS NOTES
Patient resting comfortably on bed with no reports of pain. Patient took afternoon med w/o difficulty. Patient remains a 1:1 for psychosis.

## 2022-07-29 NOTE — BH NOTES
Behavioral Health Treatment Team Note     Patient goal(s) for today: \"to go home\"  Treatment team focus/goals: continue medication management, group therapy, and provide a safe discharge    Progress note: Pt presented with a flat affect and congruent mood. Pt denied any SI/HI/AVH at the time and was oriented x3. Writer and pt discussed the importance of medication management and how to better support the pt at home. Pt agreed that getting a medication organizer would help him remember to take his medication daily. Writer talked with pt about leaving his IV in and pt was receptive by saying \"I understand\". Support session with mother:  Writer and pt had a support session with pts mother. He shared that he will keep his hands to himself and try not to \"yell\" at step-dad. Pt and mother agreed that he will go down stairs with her and her father if he is feeling stressed. Mom is supportive of pt but does want outside resources provided for pt. Writer encouraged the family to have another support session with pt and step-dad before discharge to help better the communication and set boundaries. LOS:  3  Expected LOS: TDO 08.05.22    Insurance info/prescription coverage:  [facility] MEDICAID/[facility] COMMUNITY PLAN [facility]  Date of last family contact:  07.29.22  Family requesting physician contact today:  no  Discharge plan: Will be coordinated prior to discharge  Guns in the home:  no   Outpatient provider(s):   Will be coordinated prior to discharge    Participating treatment team members: Jorgito Best, * (assigned SW), Beth Payne LMSW

## 2022-07-29 NOTE — PROGRESS NOTES
Progress Note    Patient: Bren Douglas MRN: [id number]  SSN: [id number]    YOB: 1995  Age: 32 y.o. Sex: male      Admit Date: 7/24/2022    LOS: 3 days     Subjective:     Patient is sleepy has one-to-one    Objective:     Vitals:    07/28/22 2349 07/29/22 0750 07/29/22 0900 07/29/22 0910   BP: (!) 102/57 104/68     Pulse: (!) 53 70     Resp: 18 19     Temp: 97.3 °F (36.3 °C) 98 °F (36.7 °C)     SpO2: 99%  100% 100%   Weight:       Height:            Intake and Output:  Current Shift: No intake/output data recorded. Last three shifts: 07/27 1901 - 07/29 0700  In: 480 [P.O.:480]  Out: -     Physical Exam:   General:  Alert, cooperative, no distress, appears stated age. Eyes:  Conjunctivae/corneas clear. PERRL, EOMs intact. Fundi benign   Ears:  Normal TMs and external ear canals both ears. Nose: Nares normal. Septum midline. Mucosa normal. No drainage or sinus tenderness. Mouth/Throat: Lips, mucosa, and tongue normal. Teeth and gums normal.   Neck: Supple, symmetrical, trachea midline, no adenopathy, thyroid: no enlargment/tenderness/nodules, no carotid bruit and no JVD. Back:   Symmetric, no curvature. ROM normal. No CVA tenderness. Lungs:   Clear to auscultation bilaterally. Heart:  Regular rate and rhythm, S1, S2 normal, no murmur, click, rub or gallop. Abdomen:   Soft, non-tender. Bowel sounds normal. No masses,  No organomegaly. Extremities: Extremities normal, atraumatic, no cyanosis or edema. Pulses: 2+ and symmetric all extremities. Skin: Skin color, texture, turgor normal. No rashes or lesions   Lymph nodes: Cervical, supraclavicular, and axillary nodes normal.   Neurologic: CNII-XII intact. Normal strength, sensation and reflexes throughout. Lab/Data Review: All lab results for the last 24 hours reviewed. No results found for this or any previous visit (from the past 24 hour(s)).     COVID-19      Psychosis  Plan:     Patient remains on room air continue to monitor  Continue current medications    Signed By: Liss Allen MD     July 29, 2022

## 2022-07-29 NOTE — BH NOTES
Patient is pleasant, calm and cooperative. When asked if depressed or anxious he denies. He also denies SI but when asked about HI he says sometimes he dreams of hurting his step dad but when is isn't sleeping he doesn't. Pt believes he is going home on Friday. Nurse encouraged patient that he must continue taking his medications when he gets home and at first he says I guess I should but nurse educated him of their importance and he agreed that he will take whatever is prescribed so he can keep out of the hospital and he says he doesn't want to be committed and forced to stay in the hospital again. Pt was left on side of bed and up walking around room at times with a sitter at the bedside 1:1. No distress noted, respirations regular and unlabored.

## 2022-07-29 NOTE — PROGRESS NOTES
0400 Nurse notified by bill that pt is actively seizing. Upon nurses arrival to the room pt is still seizing, it lasted about one minute. Charge nurse inserted IV  for med access. By 6851 pt is alert and speaking to nurse.  Will continue to monitor

## 2022-07-30 PROCEDURE — 74011250637 HC RX REV CODE- 250/637: Performed by: PSYCHIATRY & NEUROLOGY

## 2022-07-30 PROCEDURE — 74011250637 HC RX REV CODE- 250/637: Performed by: INTERNAL MEDICINE

## 2022-07-30 PROCEDURE — 74011250636 HC RX REV CODE- 250/636: Performed by: INTERNAL MEDICINE

## 2022-07-30 PROCEDURE — 74011000250 HC RX REV CODE- 250: Performed by: INTERNAL MEDICINE

## 2022-07-30 PROCEDURE — 65270000029 HC RM PRIVATE

## 2022-07-30 RX ADMIN — SODIUM CHLORIDE, PRESERVATIVE FREE 10 ML: 5 INJECTION INTRAVENOUS at 21:21

## 2022-07-30 RX ADMIN — AMOXICILLIN AND CLAVULANATE POTASSIUM 1 TABLET: 500; 125 TABLET, FILM COATED ORAL at 13:11

## 2022-07-30 RX ADMIN — QUETIAPINE FUMARATE 50 MG: 25 TABLET ORAL at 17:07

## 2022-07-30 RX ADMIN — DEXAMETHASONE 6 MG: 4 TABLET ORAL at 08:59

## 2022-07-30 RX ADMIN — AMOXICILLIN AND CLAVULANATE POTASSIUM 1 TABLET: 500; 125 TABLET, FILM COATED ORAL at 17:07

## 2022-07-30 RX ADMIN — ENOXAPARIN SODIUM 30 MG: 100 INJECTION SUBCUTANEOUS at 09:01

## 2022-07-30 RX ADMIN — AMOXICILLIN AND CLAVULANATE POTASSIUM 1 TABLET: 500; 125 TABLET, FILM COATED ORAL at 09:00

## 2022-07-30 RX ADMIN — QUETIAPINE FUMARATE 50 MG: 25 TABLET ORAL at 09:00

## 2022-07-30 RX ADMIN — QUETIAPINE FUMARATE 50 MG: 25 TABLET ORAL at 21:21

## 2022-07-30 NOTE — BH NOTES
Recreational Therapy    Pt declined to attend groups via ipad.  Pt stated Gelacio John was trying to go home tomorrow and he didn't need anything else to work on\"

## 2022-07-30 NOTE — PROGRESS NOTES
NEURO PROGRESS NOTE        SUBJECTIVE:   Mental status changes  COVID positive      EXAM:  Awake, Oriented to location. Aphasic. No agitation reported today. Sitter in room with patient. No focality.       ASSESSMENT/PLAN:  Current treatment continues    ALLERGIES:    No Known Allergies    MEDS:      Current Facility-Administered Medications:     midazolam (VERSED) injection 4 mg, 4 mg, IntraVENous, Q6H PRN, Bryan Suarez MD    sodium chloride (NS) flush 5-40 mL, 5-40 mL, IntraVENous, Q8H, Bryan Suarez MD, 10 mL at 07/29/22 1609    sodium chloride (NS) flush 5-40 mL, 5-40 mL, IntraVENous, PRN, Bryan Bravo MD    acetaminophen (TYLENOL) tablet 650 mg, 650 mg, Oral, Q6H PRN **OR** acetaminophen (TYLENOL) suppository 650 mg, 650 mg, Rectal, Q6H PRN, Bryan Suarez MD    polyethylene glycol (MIRALAX) packet 17 g, 17 g, Oral, DAILY PRN, Bryan Suarez MD    enoxaparin (LOVENOX) injection 30 mg, 30 mg, SubCUTAneous, Q12H, Bryan Suarez MD, 30 mg at 07/29/22 1951    dexAMETHasone (DECADRON) tablet 6 mg, 6 mg, Oral, DAILY, Bryan Suarez MD, 6 mg at 07/29/22 0855    amoxicillin-clavulanate (AUGMENTIN) 500-125 mg per tablet 1 Tablet, 1 Tablet, Oral, TID WITH MEALS, Bryan Suarez MD, 1 Tablet at 07/29/22 1603    QUEtiapine (SEROquel) tablet 50 mg, 50 mg, Oral, TID, Miriam Barone MD, 50 mg at 07/29/22 1951    haloperidol lactate (HALDOL) injection 5 mg, 5 mg, IntraMUSCular, Q6H PRN, Miriam Barone MD, 5 mg at 07/26/22 0359    diphenhydrAMINE (BENADRYL) injection 50 mg, 50 mg, IntraMUSCular, BID PRN, Miriam Barone MD, 50 mg at 07/26/22 0358    hydrOXYzine HCL (ATARAX) tablet 50 mg, 50 mg, Oral, TID PRN, Miriam Barone MD    traZODone (DESYREL) tablet 50 mg, 50 mg, Oral, QHS PRN, Miriam Barone MD, 50 mg at 07/25/22 2305    magnesium hydroxide (MILK OF MAGNESIA) 400 mg/5 mL oral suspension 30 mL, 30 mL, Oral, DAILY PRN, Miriam Barone MD    LABS:  No results found for this or any previous visit (from the past 24 hour(s)).     Visit Vitals  BP (!) 93/53   Pulse 76   Temp 97.2 °F (36.2 °C)   Resp 16   Ht 6' (1.829 m)   Wt 113.4 kg (250 lb)   SpO2 97%   BMI 33.91 kg/m²       Imaging:  No orders to display

## 2022-07-30 NOTE — PROGRESS NOTES
Progress Note    Patient: Shoaib Caceres MRN: [id number]  SSN: [id number]    YOB: 1995  Age: 32 y.o. Sex: male      Admit Date: 7/24/2022    LOS: 4 days     Subjective:     Patient is sleepy has one-to-one    Objective:     Vitals:    07/29/22 1502 07/29/22 1922 07/30/22 0021 07/30/22 0725   BP: 103/62 (!) 93/53 110/60 106/68   Pulse: 79 76 74 62   Resp: 16 16 16 16   Temp: 98 °F (36.7 °C) 97.2 °F (36.2 °C) 97.5 °F (36.4 °C) (!) 96.5 °F (35.8 °C)   SpO2: 97% 97% 98% 99%   Weight:       Height:            Intake and Output:  Current Shift: No intake/output data recorded. Last three shifts: No intake/output data recorded. Physical Exam:   General:  Alert, cooperative, no distress, appears stated age. Eyes:  Conjunctivae/corneas clear. PERRL, EOMs intact. Fundi benign   Ears:  Normal TMs and external ear canals both ears. Nose: Nares normal. Septum midline. Mucosa normal. No drainage or sinus tenderness. Mouth/Throat: Lips, mucosa, and tongue normal. Teeth and gums normal.   Neck: Supple, symmetrical, trachea midline, no adenopathy, thyroid: no enlargment/tenderness/nodules, no carotid bruit and no JVD. Back:   Symmetric, no curvature. ROM normal. No CVA tenderness. Lungs:   Clear to auscultation bilaterally. Heart:  Regular rate and rhythm, S1, S2 normal, no murmur, click, rub or gallop. Abdomen:   Soft, non-tender. Bowel sounds normal. No masses,  No organomegaly. Extremities: Extremities normal, atraumatic, no cyanosis or edema. Pulses: 2+ and symmetric all extremities. Skin: Skin color, texture, turgor normal. No rashes or lesions   Lymph nodes: Cervical, supraclavicular, and axillary nodes normal.   Neurologic: CNII-XII intact. Normal strength, sensation and reflexes throughout. Lab/Data Review: All lab results for the last 24 hours reviewed. No results found for this or any previous visit (from the past 24 hour(s)).     COVID-19      Psychosis  Plan: Patient remains on room air continue to monitor  Continue current medications    Signed By: Ronnell Honeycutt MD     July 30, 2022

## 2022-07-30 NOTE — PROGRESS NOTES
Progress Note  Date:2022       Room:[facility]  Patient Name:Raymon Guajardo     YOB: 1995     Age:26 y.o. Subjective    Subjective   Review of Systems  Objective         Vitals Last 24 Hours:  TEMPERATURE:  Temp  Av.7 °F (36.5 °C)  Min: 97.2 °F (36.2 °C)  Max: 98 °F (36.7 °C)  RESPIRATIONS RANGE: Resp  Av  Min: 16  Max: 19  PULSE OXIMETRY RANGE: SpO2  Av.5 %  Min: 97 %  Max: 100 %  PULSE RANGE: Pulse  Av  Min: 70  Max: 79  BLOOD PRESSURE RANGE: Systolic (93AZO), BGJ:043 , Min:93 , IZE:919   ; Diastolic (23QCR), CUY:44, Min:53, Max:68    I/O (24Hr): No intake or output data in the 24 hours ending 22 2350  Objective  Labs/Imaging/Diagnostics    Labs:  CBC:No results for input(s): WBC, RBC, HGB, HCT, MCV, RDW, PLT, HGBEXT, HCTEXT, PLTEXT in the last 72 hours. CHEMISTRIES:No results for input(s): NA, K, CL, CO2, BUN, CA, PHOS, MG in the last 72 hours. No lab exists for component: CREATININE, GLUCOSEPT/INR:No results for input(s): INR, INREXT in the last 72 hours. No lab exists for component: PROTIME  APTT:No results for input(s): APTT in the last 72 hours. LIVER PROFILE:No results for input(s): AST, ALT in the last 72 hours. No lab exists for component: Rehan Ma, ALKPHOS  Lab Results   Component Value Date/Time    ALT (SGPT) 22 2022 11:33 AM    AST (SGOT) 28 2022 11:33 AM    Alk. phosphatase 65 2022 11:33 AM    Bilirubin, total 0.6 2022 11:33 AM       Imaging Last 24 Hours:  No results found.   Assessment//Plan   Active Problems:    Acute psychosis ([address] (2022)      COVID-19 (2022)      Assessment & Plan patient case discussed in the treatment team patient seen for follow-up apparently had a seizures last night patient today alert verbal somewhat apathetic and guarded verbal and want to go home but not pushing hard he does indicate that he will try to get along with his stepfather when asked about his uncle he says he does not have any telephone number with the we will see whether mom has any information about the apparently the head of the family session and tried to ways to deal with staying home medication compliance and stress management.   He is compliant with the medication continued inpatient level of care indicated therapist intent to do another supportive family session prior to discharging patient has not acted up not paranoid today continued inpatient level of care indicated reassess the case tomorrow no side effects    Electronically signed by Hedy Medina MD on 7/29/2022 at 11:50 PM

## 2022-07-30 NOTE — PROGRESS NOTES
NEURO PROGRESS NOTE        SUBJECTIVE:   Mental status changes  COVID-positive      EXAM:  Awake, responds verbally,  Oriented to location day. No agitation reported  Follows commands. No new focality      ASSESSMENT/PLAN:  EEG does not show any seizure activity.     ALLERGIES:    No Known Allergies    MEDS:      Current Facility-Administered Medications:     midazolam (VERSED) injection 4 mg, 4 mg, IntraVENous, Q6H PRN, Bryan Suarez MD    sodium chloride (NS) flush 5-40 mL, 5-40 mL, IntraVENous, Q8H, Bryan Suarez MD, 10 mL at 07/29/22 1609    sodium chloride (NS) flush 5-40 mL, 5-40 mL, IntraVENous, PRN, Bryan Herzog MD    acetaminophen (TYLENOL) tablet 650 mg, 650 mg, Oral, Q6H PRN **OR** acetaminophen (TYLENOL) suppository 650 mg, 650 mg, Rectal, Q6H PRN, Bryan Suarez MD    polyethylene glycol (MIRALAX) packet 17 g, 17 g, Oral, DAILY PRN, Bryan Suraez MD    enoxaparin (LOVENOX) injection 30 mg, 30 mg, SubCUTAneous, Q12H, Bryan Suarez MD, 30 mg at 07/30/22 0901    dexAMETHasone (DECADRON) tablet 6 mg, 6 mg, Oral, DAILY, Bryan Suarez MD, 6 mg at 07/30/22 0859    amoxicillin-clavulanate (AUGMENTIN) 500-125 mg per tablet 1 Tablet, 1 Tablet, Oral, TID WITH MEALS, Bryan Suarez MD, 1 Tablet at 07/30/22 1311    QUEtiapine (SEROquel) tablet 50 mg, 50 mg, Oral, TID, Denis Cardona MD, 50 mg at 07/30/22 0900    haloperidol lactate (HALDOL) injection 5 mg, 5 mg, IntraMUSCular, Q6H PRN, Denis Cardona MD, 5 mg at 07/26/22 0359    diphenhydrAMINE (BENADRYL) injection 50 mg, 50 mg, IntraMUSCular, BID PRN, Denis Cardona MD, 50 mg at 07/26/22 0358    hydrOXYzine HCL (ATARAX) tablet 50 mg, 50 mg, Oral, TID PRN, Denis Cardona MD    traZODone (DESYREL) tablet 50 mg, 50 mg, Oral, QHS PRN, Denis Cardona MD, 50 mg at 07/25/22 4760    magnesium hydroxide (MILK OF MAGNESIA) 400 mg/5 mL oral suspension 30 mL, 30 mL, Oral, DAILY PRN, Denis Cardona MD    LABS:  No results found for this or any previous visit (from the past 24 hour(s)).     Visit Vitals  /68   Pulse 62   Temp (!) 96.5 °F (35.8 °C)   Resp 16   Ht 6' (1.829 m)   Wt 113.4 kg (250 lb)   SpO2 99%   BMI 33.91 kg/m²       Imaging:  No orders to display

## 2022-07-30 NOTE — BH NOTES
7/30/22 [address] [place] [address] Unit Shift Assessment. The pt was with assigned 1;1 staff in room watching tv. -SI/-HI/-AVH; no complaints/concern nor displayed any s/s of medical/behavioral distress. The pt was adequately groomed, calm, cooperative and candid with responses to assessment questions. The pt disclosed he [birth year] is 7/30/2022 and wanted to go out to eat. Will continue to monitor, support, and update as needed.

## 2022-07-30 NOTE — PROGRESS NOTES
Bedside shift change report given to [address] [place] [address]) by Yakelin(offgoing nurse). Report included the following information SBAR, Intake/Output, MAR, Recent Results, and Med Rec Status.

## 2022-07-30 NOTE — PROGRESS NOTES
Problem: Airway Clearance - Ineffective  Goal: Achieve or maintain patent airway  Outcome: Progressing Towards Goal     Problem: Gas Exchange - Impaired  Goal: Promote optimal lung function  Outcome: Progressing Towards Goal     Problem: Breathing Pattern - Ineffective  Goal: Ability to achieve and maintain a regular respiratory rate  Outcome: Progressing Towards Goal     Problem:  Body Temperature -  Risk of, Imbalanced  Goal: Ability to maintain a body temperature within defined limits  Outcome: Progressing Towards Goal  Goal: Will regain or maintain usual level of consciousness  Outcome: Progressing Towards Goal     Problem: Isolation Precautions - Risk of Spread of Infection  Goal: Prevent transmission of infectious organism to others  Outcome: Progressing Towards Goal

## 2022-07-31 PROCEDURE — 74011250636 HC RX REV CODE- 250/636: Performed by: INTERNAL MEDICINE

## 2022-07-31 PROCEDURE — 74011250637 HC RX REV CODE- 250/637: Performed by: INTERNAL MEDICINE

## 2022-07-31 PROCEDURE — 74011250637 HC RX REV CODE- 250/637: Performed by: PSYCHIATRY & NEUROLOGY

## 2022-07-31 PROCEDURE — 65270000029 HC RM PRIVATE

## 2022-07-31 PROCEDURE — 74011000250 HC RX REV CODE- 250: Performed by: INTERNAL MEDICINE

## 2022-07-31 RX ADMIN — SODIUM CHLORIDE, PRESERVATIVE FREE 10 ML: 5 INJECTION INTRAVENOUS at 16:46

## 2022-07-31 RX ADMIN — DEXAMETHASONE 6 MG: 4 TABLET ORAL at 08:05

## 2022-07-31 RX ADMIN — QUETIAPINE FUMARATE 50 MG: 25 TABLET ORAL at 08:05

## 2022-07-31 RX ADMIN — ENOXAPARIN SODIUM 30 MG: 100 INJECTION SUBCUTANEOUS at 21:37

## 2022-07-31 RX ADMIN — ENOXAPARIN SODIUM 30 MG: 100 INJECTION SUBCUTANEOUS at 08:05

## 2022-07-31 RX ADMIN — AMOXICILLIN AND CLAVULANATE POTASSIUM 1 TABLET: 500; 125 TABLET, FILM COATED ORAL at 12:08

## 2022-07-31 RX ADMIN — AMOXICILLIN AND CLAVULANATE POTASSIUM 1 TABLET: 500; 125 TABLET, FILM COATED ORAL at 08:05

## 2022-07-31 RX ADMIN — QUETIAPINE FUMARATE 50 MG: 25 TABLET ORAL at 21:37

## 2022-07-31 RX ADMIN — SODIUM CHLORIDE, PRESERVATIVE FREE 10 ML: 5 INJECTION INTRAVENOUS at 21:49

## 2022-07-31 RX ADMIN — QUETIAPINE FUMARATE 50 MG: 25 TABLET ORAL at 16:46

## 2022-07-31 RX ADMIN — SODIUM CHLORIDE, PRESERVATIVE FREE 10 ML: 5 INJECTION INTRAVENOUS at 05:53

## 2022-07-31 NOTE — PROGRESS NOTES
Problem: Falls - Risk of  Goal: *Absence of Falls  Description: Document Earma Joshua Fall Risk and appropriate interventions in the flowsheet.   Outcome: Progressing Towards Goal  Note: Fall Risk Interventions:            Medication Interventions: Teach patient to arise slowly         History of Falls Interventions: Room close to nurse's station

## 2022-07-31 NOTE — PROGRESS NOTES
Progress Note    Patient: Cb Tapia MRN: [id number]  SSN: [id number]    YOB: 1995  Age: 32 y.o. Sex: male      Admit Date: 7/24/2022    LOS: 5 days     Subjective:     Patient is sleepy has one-to-one    Objective:     Vitals:    07/30/22 1525 07/30/22 1906 07/30/22 2302 07/31/22 0711   BP: 103/61 110/77 (!) 88/52 109/66   Pulse: 81 81 62 63   Resp: 16 16 17 16   Temp: 98.2 °F (36.8 °C) 97.8 °F (36.6 °C) 97.6 °F (36.4 °C) 97.3 °F (36.3 °C)   SpO2: 98% 98% 97% 100%   Weight:       Height:            Intake and Output:  Current Shift: No intake/output data recorded. Last three shifts: 07/29 1901 - 07/31 0700  In: 480 [P.O.:480]  Out: -     Physical Exam:   General:  Alert, cooperative, no distress, appears stated age. Eyes:  Conjunctivae/corneas clear. PERRL, EOMs intact. Fundi benign   Ears:  Normal TMs and external ear canals both ears. Nose: Nares normal. Septum midline. Mucosa normal. No drainage or sinus tenderness. Mouth/Throat: Lips, mucosa, and tongue normal. Teeth and gums normal.   Neck: Supple, symmetrical, trachea midline, no adenopathy, thyroid: no enlargment/tenderness/nodules, no carotid bruit and no JVD. Back:   Symmetric, no curvature. ROM normal. No CVA tenderness. Lungs:   Clear to auscultation bilaterally. Heart:  Regular rate and rhythm, S1, S2 normal, no murmur, click, rub or gallop. Abdomen:   Soft, non-tender. Bowel sounds normal. No masses,  No organomegaly. Extremities: Extremities normal, atraumatic, no cyanosis or edema. Pulses: 2+ and symmetric all extremities. Skin: Skin color, texture, turgor normal. No rashes or lesions   Lymph nodes: Cervical, supraclavicular, and axillary nodes normal.   Neurologic: CNII-XII intact. Normal strength, sensation and reflexes throughout. Lab/Data Review: All lab results for the last 24 hours reviewed.      No results found for this or any previous visit (from the past 24 hour(s)).     COVID-19      Psychosis  Plan:       Continue current medications    Signed By: Johanne Nesbitt MD     July 31, 2022

## 2022-07-31 NOTE — PROGRESS NOTES
Progress Note  Date:2022       Room:[facility]  Patient Name:Raymon Mcfarlane     YOB: 1995     Age:26 y.o. Subjective    Subjective   Review of Systems  Objective         Vitals Last 24 Hours:  TEMPERATURE:  Temp  Av.5 °F (36.4 °C)  Min: 96.5 °F (35.8 °C)  Max: 98.2 °F (36.8 °C)  RESPIRATIONS RANGE: Resp  Av  Min: 16  Max: 16  PULSE OXIMETRY RANGE: SpO2  Av.3 %  Min: 98 %  Max: 99 %  PULSE RANGE: Pulse  Av.5  Min: 62  Max: 81  BLOOD PRESSURE RANGE: Systolic (38XYM), ILH:856 , Min:103 , SYI:199   ; Diastolic (31MLI), VD, Min:60, Max:77    I/O (24Hr): Intake/Output Summary (Last 24 hours) at 2022  Last data filed at 2022 2146  Gross per 24 hour   Intake 480 ml   Output --   Net 480 ml     Objective  Labs/Imaging/Diagnostics    Labs:  CBC:No results for input(s): WBC, RBC, HGB, HCT, MCV, RDW, PLT, HGBEXT, HCTEXT, PLTEXT in the last 72 hours. CHEMISTRIES:No results for input(s): NA, K, CL, CO2, BUN, CA, PHOS, MG in the last 72 hours. No lab exists for component: CREATININE, GLUCOSEPT/INR:No results for input(s): INR, INREXT in the last 72 hours. No lab exists for component: PROTIME  APTT:No results for input(s): APTT in the last 72 hours. LIVER PROFILE:No results for input(s): AST, ALT in the last 72 hours. No lab exists for component: CHANTALE MackeyPHOS  Lab Results   Component Value Date/Time    ALT (SGPT) 22 2022 11:33 AM    AST (SGOT) 28 2022 11:33 AM    Alk. phosphatase 65 2022 11:33 AM    Bilirubin, total 0.6 2022 11:33 AM       Imaging Last 24 Hours:  No results found. Assessment//Plan   Active Problems:    Acute psychosis ([facility] (2022)      COVID-19 (2022)      Assessment & Plan patient seen for follow-up chart reviewed spoke with the one-to-one staff spoke with counselor Marika Palafox no seizures mood better upon walking in the in his room.   Feeling better he was hoping to go home on science [place], [birth year] that is his birthday apparently  parents want to come and see him on Sunday that may also give an opportunity for them to see how patient feels about his situation and the his feelings about his stepfather.   Patient says he will get along with the stepfather okay he is not talking about any more about fearful stepfather going to kill him seems to be his Seroquel seems to be helping no drowsiness no side effects no seizures eating well sleeping well still remains a little bit guarded superficial.  Not talk about stepfather killing him in any paranoid signs    Electronically signed by John Zarco MD on 7/30/2022 at 10:08 PM

## 2022-07-31 NOTE — BH NOTES
Recreational Therapy    Provided pt with education handout on \"feelings\". Pt stated \"he didn't need anything else to work on\".   Pt still have leisure materials that is available to him to work on at his bedside

## 2022-07-31 NOTE — PROGRESS NOTES
Problem: Depressed Mood (Adult/Pediatric)  Goal: *STG: Demonstrates reduction in symptoms and increase in insight into coping skills/future focused  Outcome: Progressing Towards Goal  Goal: *STG: Remains safe in hospital  Outcome: Progressing Towards Goal  Goal: *STG: Complies with medication therapy  Outcome: Progressing Towards Goal     BH Shift Note:    Mr. Chito Ko was in bed watching TV with one to one staff present on the fifth floor. He denied depression, anxiety, and thoughts to harm himself or others. He stated that he was able to go home and contracted for safety. He was made aware of his behavioral health resources and how to reach them. He remains under the direct care of fifth floor nursing staff.

## 2022-07-31 NOTE — PROGRESS NOTES
Problem: Depressed Mood (Adult/Pediatric)  Goal: *STG: Verbalizes anger, guilt, and other feelings in a constructive manor    Note: Affect full. Pleasant. Denied feeling depressed, anxious, and/or suicidal. Rachna Seymour his BD is today, and family members visited. Respirations even/nonlabored. 1:1 observations being maintained by medical CNA.

## 2022-08-01 LAB
GLUCOSE BLD STRIP.AUTO-MCNC: 94 MG/DL (ref 65–117)
PERFORMED BY, TECHID: NORMAL

## 2022-08-01 PROCEDURE — 82962 GLUCOSE BLOOD TEST: CPT

## 2022-08-01 PROCEDURE — 74011000250 HC RX REV CODE- 250: Performed by: PSYCHIATRY & NEUROLOGY

## 2022-08-01 PROCEDURE — 74011250637 HC RX REV CODE- 250/637: Performed by: PSYCHIATRY & NEUROLOGY

## 2022-08-01 PROCEDURE — 74011250636 HC RX REV CODE- 250/636: Performed by: INTERNAL MEDICINE

## 2022-08-01 PROCEDURE — 65270000029 HC RM PRIVATE

## 2022-08-01 PROCEDURE — 74011250637 HC RX REV CODE- 250/637: Performed by: INTERNAL MEDICINE

## 2022-08-01 PROCEDURE — 74011000258 HC RX REV CODE- 258: Performed by: PSYCHIATRY & NEUROLOGY

## 2022-08-01 PROCEDURE — 95816 EEG AWAKE AND DROWSY: CPT | Performed by: PSYCHIATRY & NEUROLOGY

## 2022-08-01 PROCEDURE — 74011000250 HC RX REV CODE- 250: Performed by: INTERNAL MEDICINE

## 2022-08-01 RX ORDER — DIVALPROEX SODIUM 125 MG/1
250 TABLET, DELAYED RELEASE ORAL 3 TIMES DAILY
Status: DISCONTINUED | OUTPATIENT
Start: 2022-08-01 | End: 2022-08-03 | Stop reason: HOSPADM

## 2022-08-01 RX ORDER — DEXAMETHASONE 4 MG/1
4 TABLET ORAL
Qty: 2 TABLET | Refills: 0 | Status: CANCELLED | OUTPATIENT
Start: 2022-08-01

## 2022-08-01 RX ORDER — TOPIRAMATE 25 MG/1
25 TABLET ORAL 2 TIMES DAILY WITH MEALS
Status: DISCONTINUED | OUTPATIENT
Start: 2022-08-01 | End: 2022-08-01 | Stop reason: ALTCHOICE

## 2022-08-01 RX ORDER — DIVALPROEX SODIUM 125 MG/1
250 TABLET, DELAYED RELEASE ORAL 3 TIMES DAILY
Status: DISCONTINUED | OUTPATIENT
Start: 2022-08-01 | End: 2022-08-01

## 2022-08-01 RX ADMIN — SODIUM CHLORIDE, PRESERVATIVE FREE 10 ML: 5 INJECTION INTRAVENOUS at 06:55

## 2022-08-01 RX ADMIN — ENOXAPARIN SODIUM 30 MG: 100 INJECTION SUBCUTANEOUS at 08:58

## 2022-08-01 RX ADMIN — DIVALPROEX SODIUM 250 MG: 125 TABLET, DELAYED RELEASE ORAL at 17:27

## 2022-08-01 RX ADMIN — SODIUM CHLORIDE, PRESERVATIVE FREE 10 ML: 5 INJECTION INTRAVENOUS at 13:53

## 2022-08-01 RX ADMIN — AMOXICILLIN AND CLAVULANATE POTASSIUM 1 TABLET: 500; 125 TABLET, FILM COATED ORAL at 17:26

## 2022-08-01 RX ADMIN — SODIUM CHLORIDE, PRESERVATIVE FREE 10 ML: 5 INJECTION INTRAVENOUS at 21:14

## 2022-08-01 RX ADMIN — DEXAMETHASONE 6 MG: 4 TABLET ORAL at 08:58

## 2022-08-01 RX ADMIN — QUETIAPINE FUMARATE 50 MG: 25 TABLET ORAL at 08:58

## 2022-08-01 RX ADMIN — SODIUM CHLORIDE 1000 MG: 9 INJECTION, SOLUTION INTRAVENOUS at 12:21

## 2022-08-01 RX ADMIN — AMOXICILLIN AND CLAVULANATE POTASSIUM 1 TABLET: 500; 125 TABLET, FILM COATED ORAL at 08:58

## 2022-08-01 RX ADMIN — QUETIAPINE FUMARATE 50 MG: 25 TABLET ORAL at 21:13

## 2022-08-01 RX ADMIN — ENOXAPARIN SODIUM 30 MG: 100 INJECTION SUBCUTANEOUS at 21:14

## 2022-08-01 RX ADMIN — QUETIAPINE FUMARATE 50 MG: 25 TABLET ORAL at 17:26

## 2022-08-01 NOTE — BH NOTES
Pt resting in bed with his eyes closed. Pt aroused for the assessment. Pt alert, soft spoken, and mumbling words. Pt feeling tired and wants to sleep. Pt has a flat affect. He rated his anxiety a 5/10. Pt rated his depression a 6 to 7/10, then stated \" I do not know! I want to go home. I am being discharged tomorrow. \" Pt denied having any SI/HI. When asked if experiencing any hallucinations, pt stated, \" I am only hearing the voices from the T.V., nothing else. \"  No c/o pain or discomfort. Respirations are quiet/unlabored. A staff member remains with the pt for one to one observation for safety.
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
## 2022-08-01 NOTE — BH NOTES
Writer contacted pts mother, Megan Cheung. She confirmed that she is able to  pt at 2p.  She said that she will make sure pt follows up with [facility]
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
## 2022-08-01 NOTE — PROGRESS NOTES
Progress Note    Patient: Abdi Ocasio MRN: [id number]  SSN: [id number]    YOB: 1995  Age: 32 y.o. Sex: male      Admit Date: 7/24/2022    LOS: 6 days     Subjective:     32years old male admitted to the hospital for acute psychosis with screening test positive for COVID-19. Patient was admitted to the medical service with psychiatric follow-up as per psychiatric evaluation patient is improving patient has been on room air with no significant pulmonary disorder. Nursing report of mild seizure yesterday    Objective:     Vitals:    07/31/22 1653 07/31/22 1929 07/31/22 1946 08/01/22 0540   BP: 109/68 (!) 86/42 116/68 (!) 132/54   Pulse: 77 73  97   Resp: 16 18 22   Temp: 98.2 °F (36.8 °C) 98.3 °F (36.8 °C)  97 °F (36.1 °C)   SpO2: 100% 98%  96%   Weight:       Height:            Intake and Output:  Current Shift: No intake/output data recorded. Last three shifts: 07/30 1901 - 08/01 0700  In: 480 [P.O.:480]  Out: -     Physical Exam:   General:  Alert, cooperative, no distress, appears stated age. Eyes:  Conjunctivae/corneas clear. PERRL, EOMs intact. Fundi benign   Ears:  Normal TMs and external ear canals both ears. Nose: Nares normal. Septum midline. Mucosa normal. No drainage or sinus tenderness. Mouth/Throat: Lips, mucosa, and tongue normal. Teeth and gums normal.   Neck: Supple, symmetrical, trachea midline, no adenopathy, thyroid: no enlargment/tenderness/nodules, no carotid bruit and no JVD. Back:   Symmetric, no curvature. ROM normal. No CVA tenderness. Lungs:   Clear to auscultation bilaterally. Heart:  Regular rate and rhythm, S1, S2 normal, no murmur, click, rub or gallop. Abdomen:   Soft, non-tender. Bowel sounds normal. No masses,  No organomegaly. Extremities: Extremities normal, atraumatic, no cyanosis or edema. Pulses: 2+ and symmetric all extremities.    Skin: Skin color, texture, turgor normal. No rashes or lesions   Lymph nodes: Cervical, supraclavicular, and axillary nodes normal.   Neurologic: CNII-XII intact. Normal strength, sensation and reflexes throughout. Lab/Data Review: All lab results for the last 24 hours reviewed. No results found for this or any previous visit (from the past 24 hour(s)).       Assessment:     Active Problems:    Acute psychosis ([facility] [address] (7/25/2022)      COVID-19 (7/25/2022)      Mild mental retardation  Possible seizure start patient on Topamax consult neurology we will hold EEG until patient is out of quarantine  Plan:     Possible discharge in 1 to 2 days if okay with neurology    Signed By: Mayank Clifford MD     August 1, 2022

## 2022-08-01 NOTE — PROGRESS NOTES
NEURO PROGRESS NOTE        SUBJECTIVE:   Mental Status changes  Reported witnessed seizure this morning lasting about 30 seconds  COVID-positive      EXAM:  Awake, responds verbally. States that he needs some food  Oriented, not aphasic  No other seizures since 1 days early morning. ASSESSMENT/PLAN:  Hold off Topamax for now  Administer Depacon 1 g IV stat; followed by Depakote 250 mg p.o. 3 times daily 8 hours later.   Seizure precautions    ALLERGIES:    No Known Allergies    MEDS:      Current Facility-Administered Medications:     valproate (DEPACON) 1,000 mg in 0.9% sodium chloride 50 mL IVPB, 1 g, IntraVENous, ONCE, Tanwi IV, Mao Liriano MD    divalproex DR (DEPAKOTE) tablet 250 mg, 250 mg, Oral, TID, Tanwi IV, Elton Ruiz MD    midazolam (VERSED) injection 4 mg, 4 mg, IntraVENous, Q6H PRN, Bryan Suarez MD    sodium chloride (NS) flush 5-40 mL, 5-40 mL, IntraVENous, Q8H, Bryan Suarez MD, 10 mL at 08/01/22 0655    sodium chloride (NS) flush 5-40 mL, 5-40 mL, IntraVENous, PRN, Bryan Grullon MD    acetaminophen (TYLENOL) tablet 650 mg, 650 mg, Oral, Q6H PRN **OR** acetaminophen (TYLENOL) suppository 650 mg, 650 mg, Rectal, Q6H PRN, Bryan Suarez MD    polyethylene glycol (MIRALAX) packet 17 g, 17 g, Oral, DAILY PRN, Bryan Suarez MD    enoxaparin (LOVENOX) injection 30 mg, 30 mg, SubCUTAneous, Q12H, Bryan Suarez MD, 30 mg at 08/01/22 0858    dexAMETHasone (DECADRON) tablet 6 mg, 6 mg, Oral, DAILY, Bryan Suarez MD, 6 mg at 08/01/22 0858    amoxicillin-clavulanate (AUGMENTIN) 500-125 mg per tablet 1 Tablet, 1 Tablet, Oral, TID WITH MEALS, Bryan Suarez MD, 1 Tablet at 08/01/22 0858    QUEtiapine (SEROquel) tablet 50 mg, 50 mg, Oral, TID, Dione Soda B, MD, 50 mg at 08/01/22 0858    haloperidol lactate (HALDOL) injection 5 mg, 5 mg, IntraMUSCular, Q6H PRN, Wilbur Marcelo MD, 5 mg at 07/26/22 0359    diphenhydrAMINE (BENADRYL) injection 50 mg, 50 mg, IntraMUSCular, BID PRN, Roxanna Chiang MD, 50 mg at 07/26/22 0358    hydrOXYzine HCL (ATARAX) tablet 50 mg, 50 mg, Oral, TID PRN, Roxanna Chiang MD    traZODone (DESYREL) tablet 50 mg, 50 mg, Oral, QHS PRN, Roxanna Chiang MD, 50 mg at 07/25/22 2305    magnesium hydroxide (MILK OF MAGNESIA) 400 mg/5 mL oral suspension 30 mL, 30 mL, Oral, DAILY PRN, Roxanna Chiang MD    LABS:  No results found for this or any previous visit (from the past 24 hour(s)).     Visit Vitals  BP (!) 132/54 (BP 1 Location: Right upper arm)   Pulse 97   Temp 97 °F (36.1 °C)   Resp 22   Ht 6' (1.829 m)   Wt 113.4 kg (250 lb)   SpO2 96%   BMI 33.91 kg/m²       Imaging:  No orders to display

## 2022-08-01 NOTE — PROGRESS NOTES
Focus: RRT    Sitter at pt bedside called out to report patient having a seizure. This RN and support staff arrived to pt bedside to find pt unresponsive in supine position with generalized jerking and contracture of upper extremities as well as foaming at the mouth. This occurred for approximately 30 secs. Pt was placed on side and suctioning provided. During seizure VSS, BS=94. IV Depacon that was ordered for 10 am started at this time. Medication unavailable at scheduled time. Pharmacy messaged twice. RRT MD and staff at bedside to assist with care. Post-ictally pt is confused to place, time, and situation. Patient initially had a loud snore prior to becoming alert. BP taken post-ictal and BP borderline low.

## 2022-08-01 NOTE — BH NOTES
DISCHARGE SUMMARY    NAME:Raymon Ko  : 1995  MRN: [id number]    The patient Irina Ennis exhibits the ability to control behavior in a less restrictive environment. Patient's level of functioning is improving. No assaultive/destructive behavior has been observed for the past 24 hours. No suicidal/homicidal threat or behavior has been observed for the past 24 hours. There is no evidence of serious medication side effects. Patient has not been in physical or protective restraints for at least the past 24 hours. If weapons involved, how are they secured? N/a    Is patient aware of and in agreement with discharge plan? yes    Arrangements for medication:  Prescriptions given to patient, given a weeks supply or 30 day supply. Copy of discharge instructions to provider?:  yes    Arrangements for transportation home:  pt will be picked up by mother at 2p today    Keep all follow up appointments as scheduled, continue to take prescribed medications per physician instructions.   Mental health crisis number:  278 or your local mental health crisis line number at [place] [address] Emergency WARM LINE      1-760-066-MH (7122)      M-F: 9am to 9pm      Sat & Sun: 5pm - 9pm  National suicide prevention lines:                             8-159-KOCVHMD ([phone])       5-619-028-TALK ([phone])    Crisis Text Line:  Text HOME to [phone]

## 2022-08-01 NOTE — BH NOTES
Recreational Therapy    Provided pt with education handout on \"different types of automatic negative thoughts\"

## 2022-08-01 NOTE — PROGRESS NOTES
Problem: Falls - Risk of  Goal: *Absence of Falls  Description: Document Dolly Zhou Fall Risk and appropriate interventions in the flowsheet.   Outcome: Not Met  Note: Fall Risk Interventions:            Medication Interventions: Evaluate medications/consider consulting pharmacy         History of Falls Interventions: Room close to nurse's station

## 2022-08-01 NOTE — PROGRESS NOTES
Patient had a small seizure this am lasting about 30 seconds. Received no medication and vital signs were stable. Charge MONICA Barraza was at the bedside.

## 2022-08-02 LAB — VALPROATE SERPL-MCNC: 63 UG/ML (ref 50–100)

## 2022-08-02 PROCEDURE — 65270000029 HC RM PRIVATE

## 2022-08-02 PROCEDURE — 74011250636 HC RX REV CODE- 250/636: Performed by: INTERNAL MEDICINE

## 2022-08-02 PROCEDURE — 74011250637 HC RX REV CODE- 250/637: Performed by: PSYCHIATRY & NEUROLOGY

## 2022-08-02 PROCEDURE — 74011000250 HC RX REV CODE- 250: Performed by: INTERNAL MEDICINE

## 2022-08-02 PROCEDURE — 74011250637 HC RX REV CODE- 250/637: Performed by: INTERNAL MEDICINE

## 2022-08-02 PROCEDURE — 80164 ASSAY DIPROPYLACETIC ACD TOT: CPT

## 2022-08-02 PROCEDURE — 36415 COLL VENOUS BLD VENIPUNCTURE: CPT

## 2022-08-02 RX ADMIN — QUETIAPINE FUMARATE 50 MG: 25 TABLET ORAL at 10:06

## 2022-08-02 RX ADMIN — AMOXICILLIN AND CLAVULANATE POTASSIUM 1 TABLET: 500; 125 TABLET, FILM COATED ORAL at 10:06

## 2022-08-02 RX ADMIN — SODIUM CHLORIDE, PRESERVATIVE FREE 10 ML: 5 INJECTION INTRAVENOUS at 21:02

## 2022-08-02 RX ADMIN — AMOXICILLIN AND CLAVULANATE POTASSIUM 1 TABLET: 500; 125 TABLET, FILM COATED ORAL at 17:43

## 2022-08-02 RX ADMIN — SODIUM CHLORIDE, PRESERVATIVE FREE 10 ML: 5 INJECTION INTRAVENOUS at 17:43

## 2022-08-02 RX ADMIN — AMOXICILLIN AND CLAVULANATE POTASSIUM 1 TABLET: 500; 125 TABLET, FILM COATED ORAL at 13:04

## 2022-08-02 RX ADMIN — DIVALPROEX SODIUM 250 MG: 125 TABLET, DELAYED RELEASE ORAL at 10:06

## 2022-08-02 RX ADMIN — DIVALPROEX SODIUM 250 MG: 125 TABLET, DELAYED RELEASE ORAL at 17:42

## 2022-08-02 RX ADMIN — DEXAMETHASONE 6 MG: 4 TABLET ORAL at 10:07

## 2022-08-02 RX ADMIN — ENOXAPARIN SODIUM 30 MG: 100 INJECTION SUBCUTANEOUS at 10:06

## 2022-08-02 RX ADMIN — DIVALPROEX SODIUM 250 MG: 125 TABLET, DELAYED RELEASE ORAL at 02:23

## 2022-08-02 RX ADMIN — SODIUM CHLORIDE, PRESERVATIVE FREE 10 ML: 5 INJECTION INTRAVENOUS at 05:05

## 2022-08-02 RX ADMIN — QUETIAPINE FUMARATE 50 MG: 25 TABLET ORAL at 17:43

## 2022-08-02 RX ADMIN — QUETIAPINE FUMARATE 50 MG: 25 TABLET ORAL at 21:01

## 2022-08-02 NOTE — PROGRESS NOTES
NEURO PROGRESS NOTE        SUBJECTIVE:   Mental status changes  Witnessed seizure  COVID-positive      EXAM:  Awake, responds verbally, not aphasic. Has not had any seizure today. No new focality. ASSESSMENT/PLAN:  And was administered 1 g of Depacon yesterday followed by 250 mg of Depakote every 8 hours. This should help control his seizures.   Depakote level stat    ALLERGIES:    No Known Allergies    MEDS:      Current Facility-Administered Medications:     divalproex DR (DEPAKOTE) tablet 250 mg, 250 mg, Oral, TID, Cher Hernandez IV, Ray Abdul MD, 250 mg at 08/02/22 1006    midazolam (VERSED) injection 4 mg, 4 mg, IntraVENous, Q6H PRN, Bryan Suarez MD    sodium chloride (NS) flush 5-40 mL, 5-40 mL, IntraVENous, Q8H, Bryan Suarez MD, 10 mL at 08/02/22 0505    sodium chloride (NS) flush 5-40 mL, 5-40 mL, IntraVENous, PRN, Bryan Alford MD    acetaminophen (TYLENOL) tablet 650 mg, 650 mg, Oral, Q6H PRN **OR** acetaminophen (TYLENOL) suppository 650 mg, 650 mg, Rectal, Q6H PRN, Bryan Suarez MD    polyethylene glycol (MIRALAX) packet 17 g, 17 g, Oral, DAILY PRN, Bryan Suarez MD    enoxaparin (LOVENOX) injection 30 mg, 30 mg, SubCUTAneous, Q12H, Bryan Suarez MD, 30 mg at 08/02/22 1006    dexAMETHasone (DECADRON) tablet 6 mg, 6 mg, Oral, DAILY, Bryan Suarez MD, 6 mg at 08/02/22 1007    amoxicillin-clavulanate (AUGMENTIN) 500-125 mg per tablet 1 Tablet, 1 Tablet, Oral, TID WITH MEALS, Bryan Suarez MD, 1 Tablet at 08/02/22 1304    QUEtiapine (SEROquel) tablet 50 mg, 50 mg, Oral, TID, Yulia THAKUR MD, 50 mg at 08/02/22 1006    haloperidol lactate (HALDOL) injection 5 mg, 5 mg, IntraMUSCular, Q6H PRN, Ronaldo Boudreaux MD, 5 mg at 07/26/22 0359    diphenhydrAMINE (BENADRYL) injection 50 mg, 50 mg, IntraMUSCular, BID PRN, Ronaldo Boudreaux MD, 50 mg at 07/26/22 0358    hydrOXYzine HCL (ATARAX) tablet 50 mg, 50 mg, Oral, TID PRN, Ronaldo Boudreaux MD    traZODone (DESYREL) tablet 50 mg, 50 mg, Oral, QHS PRN, Joanthan Jones MD, 50 mg at 07/25/22 2305    magnesium hydroxide (MILK OF MAGNESIA) 400 mg/5 mL oral suspension 30 mL, 30 mL, Oral, DAILY PRN, Jonathan Jones MD    LABS:  No results found for this or any previous visit (from the past 24 hour(s)).     Visit Vitals  BP (!) 105/58   Pulse 87   Temp 99.5 °F (37.5 °C)   Resp 20   Ht 6' 0.01\" (1.829 m)   Wt 113.4 kg (250 lb)   SpO2 97%   BMI 33.90 kg/m²       Imaging:  No orders to display

## 2022-08-02 NOTE — PROGRESS NOTES
Progress Note  Date:2022       Room:[facility]  Patient Name:Raymon Hayes     YOB: 1995     Age:27 y.o. Subjective    Subjective   Review of Systems  Objective         Vitals Last 24 Hours:  TEMPERATURE:  Temp  Av.8 °F (36.6 °C)  Min: 97 °F (36.1 °C)  Max: 98.5 °F (36.9 °C)  RESPIRATIONS RANGE: Resp  Av.4  Min: 18  Max: 22  PULSE OXIMETRY RANGE: SpO2  Av.2 %  Min: 94 %  Max: 99 %  PULSE RANGE: Pulse  Av.4  Min: 83  Max: 101  BLOOD PRESSURE RANGE: Systolic (94ETV), BWO:648 , Min:100 , SQL:031   ; Diastolic (66GEM), CCS:22, Min:54, Max:61    I/O (24Hr): No intake or output data in the 24 hours ending 22  Objective:  Vital signs: (most recent): Blood pressure (!) 100/58, pulse 89, temperature 98.5 °F (36.9 °C), resp. rate 18, height 6' (1.829 m), weight 113.4 kg (250 lb), SpO2 97 %. Labs/Imaging/Diagnostics    Labs:  CBC:No results for input(s): WBC, RBC, HGB, HCT, MCV, RDW, PLT, HGBEXT, HCTEXT, PLTEXT in the last 72 hours. CHEMISTRIES:No results for input(s): NA, K, CL, CO2, BUN, CA, PHOS, MG in the last 72 hours. No lab exists for component: CREATININE, GLUCOSEPT/INR:No results for input(s): INR, INREXT in the last 72 hours. No lab exists for component: PROTIME  APTT:No results for input(s): APTT in the last 72 hours. LIVER PROFILE:No results for input(s): AST, ALT in the last 72 hours. No lab exists for component: [place] Nata ALKPHOS  Lab Results   Component Value Date/Time    ALT (SGPT) 2022 11:33 AM    AST (SGOT) 28 2022 11:33 AM    Alk. phosphatase 65 2022 11:33 AM    Bilirubin, total 0.6 2022 11:33 AM       Imaging Last 24 Hours:  No results found.   Assessment//Plan   Active Problems:    Acute psychosis ([address] (2022)      COVID-19 (2022)      Assessment & Plan patient seen for follow-up patient remains calm and polite no paranoia noted negative thoughts about her mother or stepfather try to complying with the medication tried to complying with the family expectations apparently had a seizures he is on IV Dilantin today he is also going for a EEG at discharge approved from psych point of view but is still neurologically he is being addressed.   Continued inpatient level of care indicated at this time thank you    Electronically signed by Emilie Bailey MD on 8/1/2022 at 10:57 PM

## 2022-08-02 NOTE — PROGRESS NOTES
Problem: Depressed Mood (Adult/Pediatric)  Goal: *STG: Demonstrates reduction in symptoms and increase in insight into coping skills/future focused  Outcome: Progressing Towards Goal  Goal: *STG: Remains safe in hospital  Outcome: Progressing Towards Goal     BH Progress Note:    One to one staff was present. Patient was cooperative with the assessment though presented dull and subdued. He denied hallucinations or thoughts to harm himself, anxiety or depression. He was made aware of his behavioral health resources.

## 2022-08-02 NOTE — PROGRESS NOTES
Problem: Falls - Risk of  Goal: *Absence of Falls  Description: Document Jason Garber Fall Risk and appropriate interventions in the flowsheet.   Outcome: Progressing Towards Goal  Note: Fall Risk Interventions:            Medication Interventions: Evaluate medications/consider consulting pharmacy         History of Falls Interventions: Room close to nurse's station

## 2022-08-02 NOTE — PROGRESS NOTES
CM reviewed chart. Patient is being followed by behavioral health. CM will continue to follow for any needs.

## 2022-08-02 NOTE — PROGRESS NOTES
Progress Note    Patient: Francisco Javier Messina MRN: [id number]  SSN: [id number]    YOB: 1995  Age: 32 y.o. Sex: male      Admit Date: 7/24/2022    LOS: 7 days     Subjective:     32years old COVID-23, seizure, mentally challenged    Objective:     Vitals:    08/02/22 0811 08/02/22 1456 08/02/22 1500 08/02/22 1512   BP: 108/67  (!) 105/58    Pulse: 94  87    Resp: 20 20 20    Temp: 97 °F (36.1 °C) 99.1 °F (37.3 °C) 99.5 °F (37.5 °C)    SpO2: 98% 96% 97%    Weight:       Height:    6' 0.01\" (1.829 m)        Intake and Output:  Current Shift: No intake/output data recorded. Last three shifts: No intake/output data recorded. Physical Exam:   General:  Alert, cooperative, no distress, appears stated age. Eyes:  Conjunctivae/corneas clear. PERRL, EOMs intact. Fundi benign   Ears:  Normal TMs and external ear canals both ears. Nose: Nares normal. Septum midline. Mucosa normal. No drainage or sinus tenderness. Mouth/Throat: Lips, mucosa, and tongue normal. Teeth and gums normal.   Neck: Supple, symmetrical, trachea midline, no adenopathy, thyroid: no enlargment/tenderness/nodules, no carotid bruit and no JVD. Back:   Symmetric, no curvature. ROM normal. No CVA tenderness. Lungs:   Clear to auscultation bilaterally. Heart:  Regular rate and rhythm, S1, S2 normal, no murmur, click, rub or gallop. Abdomen:   Soft, non-tender. Bowel sounds normal. No masses,  No organomegaly. Extremities: Extremities normal, atraumatic, no cyanosis or edema. Pulses: 2+ and symmetric all extremities. Skin: Skin color, texture, turgor normal. No rashes or lesions   Lymph nodes: Cervical, supraclavicular, and axillary nodes normal.   Neurologic: CNII-XII intact. Normal strength, sensation and reflexes throughout. Lab/Data Review: All lab results for the last 24 hours reviewed. No results found for this or any previous visit (from the past 24 hour(s)).       Assessment:     Active Problems: Acute psychosis ([facility] [address] (7/25/2022)      COVID-19 (7/25/2022)        Plan:     Depakote level.   Plan discharge once cleared by neurology    Signed By: Jefm Alpers, MD     August 2, 2022

## 2022-08-02 NOTE — PROGRESS NOTES
Comprehensive Nutrition Assessment    Type and Reason for Visit: RD nutrition re-screen/LOS    Nutrition Recommendations/Plan:   Continue current diet  Prune juice daily for constipation     Malnutrition Assessment:  Malnutrition Status:  No malnutrition (08/02/22 1539)        Nutrition Assessment:    Admitted for acute psychosis, +COVID-19. No appetite and no intakes documented in EMR. RD called pt x2, unsuccessful. Per RN pt with excellent appetite and intakes of >75% of meals, no nutrition concerns at this time. No labs to review, Meds: Augmentin, dexamethasone, depakote, seroquel, PRN milk of Magnesia. Nutrition Related Findings:    Unable to complete NFPE d/t COVID-19 precautions. No hx of dysphagia. No N/V, last BM pta- pt denied MoM at this time, RD to add prune juice. No edema. Wound Type: None    Current Nutrition Intake & Therapies:  Average Meal Intake: %  Average Supplement Intake: None ordered  ADULT DIET Regular; No sharps/double portions    Anthropometric Measures:  Height: 6' 0.01\" (182.9 cm)  Ideal Body Weight (IBW): 178 lbs (81 kg)     Current Body Wt:  113.4 kg (250 lb) (7/24), 140.5 % IBW. Not specified  Current BMI (kg/m2): 33.9  Usual Body Weight:  (rudi)     Weight Adjustment: No adjustment                 BMI Category: Obese class 1 (BMI 30.0-34. 9)  Wt Readings from Last 10 Encounters:   07/24/22 113.4 kg (250 lb)   07/22/22 106.6 kg (235 lb)   ?  Recent wt changes, likely stated vs estimated      Nutrition Diagnosis:   No nutrition diagnosis at this time       Nutrition Interventions:   Food and/or Nutrient Delivery: Continue current diet, Snacks (specify) (Prune juice)  Nutrition Education/Counseling: No recommendations at this time, Education not indicated  Coordination of Nutrition Care: Continue to monitor while inpatient  Plan of Care discussed with: RN      Nutrition Monitoring and Evaluation:   Behavioral-Environmental Outcomes: None identified  Food/Nutrient Intake Outcomes: Diet advancement/tolerance, Food and nutrient intake  Physical Signs/Symptoms Outcomes: Weight, Constipation    Discharge Planning:    No discharge needs at this time    [employer]: Ext [phone], or via [address] [place] [address]

## 2022-08-03 VITALS
HEIGHT: 72 IN | SYSTOLIC BLOOD PRESSURE: 100 MMHG | OXYGEN SATURATION: 99 % | WEIGHT: 250 LBS | TEMPERATURE: 97 F | HEART RATE: 90 BPM | BODY MASS INDEX: 33.86 KG/M2 | RESPIRATION RATE: 16 BRPM | DIASTOLIC BLOOD PRESSURE: 61 MMHG

## 2022-08-03 PROCEDURE — 74011250636 HC RX REV CODE- 250/636: Performed by: INTERNAL MEDICINE

## 2022-08-03 PROCEDURE — 74011250637 HC RX REV CODE- 250/637: Performed by: PSYCHIATRY & NEUROLOGY

## 2022-08-03 PROCEDURE — 74011000250 HC RX REV CODE- 250: Performed by: INTERNAL MEDICINE

## 2022-08-03 PROCEDURE — 74011250637 HC RX REV CODE- 250/637: Performed by: INTERNAL MEDICINE

## 2022-08-03 RX ORDER — QUETIAPINE FUMARATE 50 MG/1
50 TABLET, FILM COATED ORAL 3 TIMES DAILY
Qty: 90 TABLET | Refills: 0 | Status: SHIPPED | OUTPATIENT
Start: 2022-08-03

## 2022-08-03 RX ORDER — DIVALPROEX SODIUM 250 MG/1
250 TABLET, DELAYED RELEASE ORAL 4 TIMES DAILY
Qty: 120 TABLET | Refills: 0 | Status: SHIPPED | OUTPATIENT
Start: 2022-08-03

## 2022-08-03 RX ORDER — AMOXICILLIN AND CLAVULANATE POTASSIUM 500; 125 MG/1; MG/1
1 TABLET, FILM COATED ORAL
Qty: 9 TABLET | Refills: 0 | Status: SHIPPED | OUTPATIENT
Start: 2022-08-03

## 2022-08-03 RX ADMIN — DIVALPROEX SODIUM 250 MG: 125 TABLET, DELAYED RELEASE ORAL at 17:41

## 2022-08-03 RX ADMIN — SODIUM CHLORIDE, PRESERVATIVE FREE 10 ML: 5 INJECTION INTRAVENOUS at 13:17

## 2022-08-03 RX ADMIN — QUETIAPINE FUMARATE 50 MG: 25 TABLET ORAL at 09:50

## 2022-08-03 RX ADMIN — QUETIAPINE FUMARATE 50 MG: 25 TABLET ORAL at 17:41

## 2022-08-03 RX ADMIN — AMOXICILLIN AND CLAVULANATE POTASSIUM 1 TABLET: 500; 125 TABLET, FILM COATED ORAL at 13:25

## 2022-08-03 RX ADMIN — DIVALPROEX SODIUM 250 MG: 125 TABLET, DELAYED RELEASE ORAL at 09:50

## 2022-08-03 RX ADMIN — AMOXICILLIN AND CLAVULANATE POTASSIUM 1 TABLET: 500; 125 TABLET, FILM COATED ORAL at 18:28

## 2022-08-03 RX ADMIN — ENOXAPARIN SODIUM 30 MG: 100 INJECTION SUBCUTANEOUS at 09:49

## 2022-08-03 RX ADMIN — AMOXICILLIN AND CLAVULANATE POTASSIUM 1 TABLET: 500; 125 TABLET, FILM COATED ORAL at 09:50

## 2022-08-03 RX ADMIN — DEXAMETHASONE 6 MG: 4 TABLET ORAL at 09:50

## 2022-08-03 NOTE — BH NOTES
Client lying quietly in bed. Alert and orientedx 3. Client appeared a little irritated but denied it. He denies feeling suicidal or homicidal.Denies feeling depressed. Denied needing anything and said he was ok. Client remains on 1:1 observation with staff at the bedside

## 2022-08-03 NOTE — PROGRESS NOTES
NEURO PROGRESS NOTE        SUBJECTIVE:   Mental status changes  Witnessed seizures  COVID-positive      EXAM:  Awake, alert, oriented  Responds verbally, follows commands  No seizures reported by the past 24 hours    Valproic acid level is 63 which is subtherapeutic      ASSESSMENT/PLAN:  From a neurological standpoint, patient can be discharged on Depakote 250 mg p.o. 3 times daily.   He will need to follow-up with me next week by calling [phone] for appointment    ALLERGIES:    No Known Allergies    MEDS:      Current Facility-Administered Medications:     divalproex DR (DEPAKOTE) tablet 250 mg, 250 mg, Oral, TID, Jesika Finn IV, Anastasia Irene MD, 250 mg at 08/02/22 1742    midazolam (VERSED) injection 4 mg, 4 mg, IntraVENous, Q6H PRN, Bryan Suarez MD    sodium chloride (NS) flush 5-40 mL, 5-40 mL, IntraVENous, Q8H, Bryan Suarez MD, 10 mL at 08/02/22 2102    sodium chloride (NS) flush 5-40 mL, 5-40 mL, IntraVENous, PRN, Bryan Manning MD    acetaminophen (TYLENOL) tablet 650 mg, 650 mg, Oral, Q6H PRN **OR** acetaminophen (TYLENOL) suppository 650 mg, 650 mg, Rectal, Q6H PRN, Bryan Suarez MD    polyethylene glycol (MIRALAX) packet 17 g, 17 g, Oral, DAILY PRN, Bryan Suarez MD    enoxaparin (LOVENOX) injection 30 mg, 30 mg, SubCUTAneous, Q12H, Bryan Suarez MD, 30 mg at 08/02/22 1006    dexAMETHasone (DECADRON) tablet 6 mg, 6 mg, Oral, DAILY, Bryan Suarez MD, 6 mg at 08/02/22 1007    amoxicillin-clavulanate (AUGMENTIN) 500-125 mg per tablet 1 Tablet, 1 Tablet, Oral, TID WITH MEALS, Bryan Suarez MD, 1 Tablet at 08/02/22 1743    QUEtiapine (SEROquel) tablet 50 mg, 50 mg, Oral, TID, Brett THAKUR MD, 50 mg at 08/02/22 2101    haloperidol lactate (HALDOL) injection 5 mg, 5 mg, IntraMUSCular, Q6H PRN, Adithya Alexander MD, 5 mg at 07/26/22 0359    diphenhydrAMINE (BENADRYL) injection 50 mg, 50 mg, IntraMUSCular, BID PRN, Adithya Alexander MD, 50 mg at 07/26/22 0358    hydrOXYzine HCL (ATARAX) tablet 50 mg, 50 mg, Oral, TID PRN, Ankit Bush MD    traZODone (DESYREL) tablet 50 mg, 50 mg, Oral, QHS PRN, Ankit Bush MD, 50 mg at 07/25/22 2305    magnesium hydroxide (MILK OF MAGNESIA) 400 mg/5 mL oral suspension 30 mL, 30 mL, Oral, DAILY PRN, Ankit Bush MD    LABS:  Recent Results (from the past 24 hour(s))   VALPROIC ACID    Collection Time: 08/02/22  7:27 PM   Result Value Ref Range    Valproic acid 63 50 - 100 ug/mL       Visit Vitals  /73 (BP 1 Location: Right upper arm, BP Patient Position: At rest)   Pulse 71   Temp 97.8 °F (36.6 °C)   Resp 18   Ht 6' 0.01\" (1.829 m)   Wt 113.4 kg (250 lb)   SpO2 100%   BMI 33.90 kg/m²       Imaging:  No orders to display

## 2022-08-03 NOTE — PROGRESS NOTES
Problem: Gas Exchange - Impaired  Goal: Absence of hypoxia  Outcome: Progressing Towards Goal  Goal: Promote optimal lung function  Outcome: Progressing Towards Goal     Problem: Loneliness or Risk for Loneliness  Goal: Demonstrate positive use of time alone when socialization is not possible  Outcome: Progressing Towards Goal     Problem: Patient Education: Go to Patient Education Activity  Goal: Patient/Family Education  Outcome: Progressing Towards Goal     Problem: Depressed Mood (Adult/Pediatric)  Goal: *STG: Participates in treatment plan  Outcome: Progressing Towards Goal

## 2022-08-03 NOTE — CONSULTS
[address]    Name:  Tiara Vickers  MR#:  [id number]  :  1995  ACCOUNT #:  [de-identified]  DATE OF SERVICE:  2022    PSYCHIATRIC FOLLOWUP    This patient was a behavioral health patient for paranoia, mood swings. When I saw him on 2022, psychiatrically, he was stabilized; however, he was still having the seizures, having IV Depakote. An EEG was ordered by Dr. Winnie Farnsworth, and psychiatric discharge summary was on hold as he was still here for seizure assessment, and the discharge was postponed. I saw him on 2022, spoke with Dr. Winnie Farnsworth that psychiatrically he is stable, he can be discharged pending neurological clearance. He ordered an EEG and continued to follow. He kept him on 2022 and then was cleared for discharge on 2022.       Sydni Blanton MD      [id number]  D:  2022 16:00  T:  2022 18:09  JOB #:  [id number]

## 2022-08-03 NOTE — PROGRESS NOTES
Spiritual Care Assessment/Progress Note  [facility]      NAME: Abdi Ocasio      MRN: [id number]  AGE: 32 y.o. SEX: male  Advent Affiliation: Buddhism   Language: English     8/3/2022     Total Time (in minutes): 10     Spiritual Assessment begun in [facility] through conversation with:         [x]Patient        [] Family    [] Friend(s)        Reason for Consult: Initial visit     Spiritual beliefs: (Please include comment if needed)     [] Identifies with a cassi tradition:         [] Supported by a cassi community:            [] Claims no spiritual orientation:           [] Seeking spiritual identity:                [] Adheres to an individual form of spirituality:           [x] Not able to assess:                           Identified resources for coping:      [] Prayer                               [] Music                  [] Guided Imagery     [] Family/friends                 [] Pet visits     [] Devotional reading                         [x] Unknown     [] Other:                                              Interventions offered during this visit: (See comments for more details)    Patient Interventions: Initial visit, Other (comment) (Silent support and prayer)           Plan of Care:     [] Support spiritual and/or cultural needs    [] Support AMD and/or advance care planning process      [] Support grieving process   [] Coordinate Rites and/or Rituals    [] Coordination with community clergy   [] No spiritual needs identified at this time   [] Detailed Plan of Care below (See Comments)  [] Make referral to Music Therapy  [] Make referral to Pet Therapy     [] Make referral to Addiction services  [] Make referral to [facility]  [] Make referral to Spiritual Care Partner  [] No future visits requested        [x] Contact Spiritual Care for further referrals     Comments:  Visit attempted for patient in [place] [address] for initial assessment.   Provided telechaplaincy for patient in droplet isolation but the patient did not answer the phone call. Provided prayer per patient's listed cassi tradition. Contact chaplains for further spiritual care and support. Chaplain Júnior West M.Div.    can be reached by calling the  at [facility]  [phone]

## 2022-08-03 NOTE — DISCHARGE SUMMARY
Discharge Summary     Patient: Perla Boyle MRN: [id number]  SSN: [id number]    YOB: 1995  Age: 32 y.o. Sex: male       Admit Date: 7/24/2022    Discharge Date: 8/3/2022      Admission Diagnoses: Acute psychosis ([facility] [F23]  COVID-19 [U07.1]    Discharge Diagnoses:   Problem List as of 8/3/2022 Never Reviewed            Codes Class Noted - Resolved    Acute psychosis ([facility] ICD-10-CM: F23  ICD-9-CM: 298.9  7/25/2022 - Present        COVID-19 ICD-10-CM: U07.1  ICD-9-CM: 079.89  7/25/2022 - Present            Discharge Condition: Good    Hospital Course: 32years old patient with acute psychosis was found to be cold COVID-positive and admitted to the hospital medical floor with psychiatric consult. Patient was placed on one-to-one and had a seizure event was seen by neurologist.  Was placed on Depakote. Consults: Neurology and Psychiatry    Significant Diagnostic Studies: labs:   Recent Results (from the past 24 hour(s))   VALPROIC ACID    Collection Time: 08/02/22  7:27 PM   Result Value Ref Range    Valproic acid 63 50 - 100 ug/mL         No orders to display       Disposition: home    Discharge Medications:   Current Discharge Medication List        START taking these medications    Details   amoxicillin-clavulanate (AUGMENTIN) 500-125 mg per tablet Take 1 Tablet by mouth three (3) times daily (with meals). Qty: 9 Tablet, Refills: 0      QUEtiapine (SEROquel) 50 mg tablet Take 1 Tablet by mouth three (3) times daily. Qty: 90 Tablet, Refills: 0      divalproex DR (DEPAKOTE) 250 mg tablet Take 1 Tablet by mouth four (4) times daily.  Indications: Start 8 hrs post IV Depacon  Qty: 120 Tablet, Refills: 0           STOP taking these medications       diazePAM (Valtoco) 20 mg/2 spray (10mg/0.1mL x2) spry Comments:   Reason for Stopping:         eslicarbazepine (Aptiom) 400 mg tab Comments:   Reason for Stopping:         eslicarbazepine (Aptiom) 800 mg tab tablet Comments:   Reason for Stopping:         perampaneL (Fycompa) 2 mg tablet Comments:   Reason for Stopping:         perampaneL (Fycompa) 8 mg tab tablet Comments:   Reason for Stopping:         valproate (DEPAKENE) 250 mg/5 mL syrup Comments:   Reason for Stopping:               Activity: Activity as tolerated  Diet: Regular Diet  Wound Care: None needed    Follow-up Appointments   Procedures    FOLLOW UP VISIT Appointment in: One Week     Standing Status:   Standing     Number of Occurrences:   1     Order Specific Question:   Appointment in     Answer:    One Week   55 minutes discharge time    Signed By: Cleveland Gregory MD     August 3, 2022

## 2022-08-03 NOTE — PROGRESS NOTES
Went over discharge papers with pt's mother. Pt wheeled down by sitter. Discharged in stable condition

## 2022-08-03 NOTE — PROGRESS NOTES
CM reviewed chart, noted discharge order. CM notified patient's mother, Maya Finney ([phone]) of discharge today. She verbalized her understanding and stated that she is currently at work, and would pick him up between 5:00 and 6:00 PM tonight. Patient is clear to discharge home with his mother by CM. Nurse is aware. Discharge plan of care/case management plan validated with provider discharge order.
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
## 2022-08-06 NOTE — PROCEDURES
[facility]  EEG    Name:  Villa Hopper  MR#:  [id number]  :  1995  ACCOUNT #:  [de-identified]  DATE OF SERVICE:  2022    DIAGNOSIS:  Seizures. DESCRIPTION:  Recording is done digitally on computer. Electrodes are placed in a 10-20 international system. Initial recording is equipment calibration followed by biocalibration. Initial montages are bipolar montages. Tracing started with the patient drowsy. As the recording continues, the patient is hooked up to an EKG machine that shows a heart rate of 120. Tracings continue with the patient being exposed to photic stimulation without any abnormality. Hyperventilation is not done. IMPRESSION:  This is an EEG showing the patient drowsy. There is no seizure activity.       Ginna Romero MD      [id number]  D:  2022 13:58  T:  2022 23:53  JOB #:  [id number]

## 2024-02-07 ENCOUNTER — HOSPITAL ENCOUNTER (INPATIENT)
Facility: HOSPITAL | Age: 29
LOS: 6 days | Discharge: HOME | DRG: 753 | End: 2024-02-15
Attending: STUDENT IN AN ORGANIZED HEALTH CARE EDUCATION/TRAINING PROGRAM | Admitting: PSYCHIATRY & NEUROLOGY
Payer: MEDICAID

## 2024-02-07 DIAGNOSIS — R46.89 AGGRESSIVE BEHAVIOR: Primary | ICD-10-CM

## 2024-02-07 LAB
ALBUMIN SERPL-MCNC: 3.8 G/DL (ref 3.5–5)
ALBUMIN/GLOB SERPL: 0.8 (ref 1.1–2.2)
ALP SERPL-CCNC: 41 U/L (ref 45–117)
ALT SERPL-CCNC: 88 U/L (ref 12–78)
AMPHET UR QL SCN: NEGATIVE
ANION GAP SERPL CALC-SCNC: 4 MMOL/L (ref 5–15)
APPEARANCE UR: CLEAR
AST SERPL W P-5'-P-CCNC: 42 U/L (ref 15–37)
BACTERIA URNS QL MICRO: NEGATIVE /HPF
BARBITURATES UR QL SCN: NEGATIVE
BASOPHILS # BLD: 0 K/UL (ref 0–0.1)
BASOPHILS NFR BLD: 0 % (ref 0–1)
BENZODIAZ UR QL: NEGATIVE
BILIRUB SERPL-MCNC: 0.4 MG/DL (ref 0.2–1)
BILIRUB UR QL: NEGATIVE
BUN SERPL-MCNC: 11 MG/DL (ref 6–20)
BUN/CREAT SERPL: 19 (ref 12–20)
CA-I BLD-MCNC: 9.6 MG/DL (ref 8.5–10.1)
CANNABINOIDS UR QL SCN: NEGATIVE
CHLORIDE SERPL-SCNC: 110 MMOL/L (ref 97–108)
CO2 SERPL-SCNC: 27 MMOL/L (ref 21–32)
COCAINE UR QL SCN: NEGATIVE
COLOR UR: ABNORMAL
CREAT SERPL-MCNC: 0.58 MG/DL (ref 0.7–1.3)
DIFFERENTIAL METHOD BLD: ABNORMAL
EOSINOPHIL # BLD: 1.2 K/UL (ref 0–0.4)
EOSINOPHIL NFR BLD: 13 % (ref 0–7)
EPITH CASTS URNS QL MICRO: ABNORMAL /LPF
ERYTHROCYTE [DISTWIDTH] IN BLOOD BY AUTOMATED COUNT: 13.6 % (ref 11.5–14.5)
ETHANOL SERPL-MCNC: <10 MG/DL (ref 0–0.08)
FLUAV RNA SPEC QL NAA+PROBE: NOT DETECTED
FLUBV RNA SPEC QL NAA+PROBE: NOT DETECTED
GLOBULIN SER CALC-MCNC: 4.5 G/DL (ref 2–4)
GLUCOSE SERPL-MCNC: 95 MG/DL (ref 65–100)
GLUCOSE UR STRIP.AUTO-MCNC: NEGATIVE MG/DL
HCT VFR BLD AUTO: 43.8 % (ref 36.6–50.3)
HGB BLD-MCNC: 13.8 G/DL (ref 12.1–17)
HGB UR QL STRIP: NEGATIVE
IMM GRANULOCYTES # BLD AUTO: 0 K/UL (ref 0–0.04)
IMM GRANULOCYTES NFR BLD AUTO: 0 % (ref 0–0.5)
KETONES UR QL STRIP.AUTO: 5 MG/DL
LEUKOCYTE ESTERASE UR QL STRIP.AUTO: NEGATIVE
LYMPHOCYTES # BLD: 2.4 K/UL (ref 0.8–3.5)
LYMPHOCYTES NFR BLD: 25 % (ref 12–49)
Lab: NORMAL
MCH RBC QN AUTO: 24.1 PG (ref 26–34)
MCHC RBC AUTO-ENTMCNC: 31.5 G/DL (ref 30–36.5)
MCV RBC AUTO: 76.6 FL (ref 80–99)
METHADONE UR QL: NEGATIVE
MONOCYTES # BLD: 0.5 K/UL (ref 0–1)
MONOCYTES NFR BLD: 5 % (ref 5–13)
MUCOUS THREADS URNS QL MICRO: ABNORMAL /LPF
NEUTS SEG # BLD: 5.3 K/UL (ref 1.8–8)
NEUTS SEG NFR BLD: 57 % (ref 32–75)
NITRITE UR QL STRIP.AUTO: NEGATIVE
NRBC # BLD: 0 K/UL (ref 0–0.01)
NRBC BLD-RTO: 0 PER 100 WBC
OPIATES UR QL: NEGATIVE
PCP UR QL: NEGATIVE
PH UR STRIP: 6 (ref 5–8)
PLATELET # BLD AUTO: 187 K/UL (ref 150–400)
PMV BLD AUTO: 10.3 FL (ref 8.9–12.9)
POTASSIUM SERPL-SCNC: 3.9 MMOL/L (ref 3.5–5.1)
PROT SERPL-MCNC: 8.3 G/DL (ref 6.4–8.2)
PROT UR STRIP-MCNC: 30 MG/DL
RBC # BLD AUTO: 5.72 M/UL (ref 4.1–5.7)
RBC #/AREA URNS HPF: ABNORMAL /HPF (ref 0–5)
SARS-COV-2 RNA RESP QL NAA+PROBE: NOT DETECTED
SODIUM SERPL-SCNC: 141 MMOL/L (ref 136–145)
SP GR UR REFRACTOMETRY: 1.03 (ref 1–1.03)
URINE CULTURE IF INDICATED: ABNORMAL
UROBILINOGEN UR QL STRIP.AUTO: 4 EU/DL (ref 0.1–1)
VALPROATE SERPL-MCNC: 119 UG/ML (ref 50–100)
WBC # BLD AUTO: 9.6 K/UL (ref 4.1–11.1)
WBC URNS QL MICRO: ABNORMAL /HPF (ref 0–4)

## 2024-02-07 PROCEDURE — 99285 EMERGENCY DEPT VISIT HI MDM: CPT

## 2024-02-07 PROCEDURE — 80164 ASSAY DIPROPYLACETIC ACD TOT: CPT

## 2024-02-07 PROCEDURE — 81001 URINALYSIS AUTO W/SCOPE: CPT

## 2024-02-07 PROCEDURE — 80053 COMPREHEN METABOLIC PANEL: CPT

## 2024-02-07 PROCEDURE — 87636 SARSCOV2 & INF A&B AMP PRB: CPT

## 2024-02-07 PROCEDURE — 82077 ASSAY SPEC XCP UR&BREATH IA: CPT

## 2024-02-07 PROCEDURE — 85025 COMPLETE CBC W/AUTO DIFF WBC: CPT

## 2024-02-07 PROCEDURE — 80307 DRUG TEST PRSMV CHEM ANLYZR: CPT
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
## 2024-02-07 NOTE — ED PROVIDER NOTES
Use: Never     Smokeless Tobacco Use: Never     Passive Exposure: Not on file   Alcohol Use: Not At Risk (2/7/2024)    AUDIT-C     Frequency of Alcohol Consumption: Never     Average Number of Drinks: Patient does not drink     Frequency of Binge Drinking: Never   Financial Resource Strain: Not on file   Food Insecurity: Not on file   Transportation Needs: Not on file   Physical Activity: Not on file   Stress: Not on file   Social Connections: Not on file   Intimate Partner Violence: Not on file   Depression: Not at risk (7/22/2022)    PHQ-2     PHQ-2 Score: 0   Housing Stability: Not on file   Interpersonal Safety: Not on file   Utilities: Not on file       PHYSICAL EXAM   Physical Exam  Vitals and nursing note reviewed.   Constitutional:       General: He is not in acute distress.     Appearance: He is not ill-appearing, toxic-appearing or diaphoretic.   HENT:      Head: Normocephalic and atraumatic.   Eyes:      Extraocular Movements: Extraocular movements intact.      Conjunctiva/sclera: Conjunctivae normal.   Cardiovascular:      Rate and Rhythm: Normal rate and regular rhythm.   Pulmonary:      Effort: Pulmonary effort is normal.      Breath sounds: Normal breath sounds.   Abdominal:      General: There is no distension.      Palpations: Abdomen is soft.      Tenderness: There is no abdominal tenderness.   Neurological:      General: No focal deficit present.      Mental Status: He is alert. Mental status is at baseline.   Psychiatric:         Thought Content: Thought content does not include homicidal or suicidal ideation.         SCREENINGS               LAB, EKG AND DIAGNOSTIC RESULTS   Labs:  Recent Results (from the past 12 hour(s))   Urinalysis with Reflex to Culture    Collection Time: 02/07/24  3:57 PM    Specimen: Urine   Result Value Ref Range    Color, UA Yellow/Straw      Appearance Clear Clear      Specific Gravity, UA 1.030 1.003 - 1.030      pH, Urine 6.0 5.0 - 8.0      Protein, UA 30 (A)
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
## 2024-02-08 PROCEDURE — 6370000000 HC RX 637 (ALT 250 FOR IP): Performed by: STUDENT IN AN ORGANIZED HEALTH CARE EDUCATION/TRAINING PROGRAM

## 2024-02-08 RX ORDER — VALPROIC ACID 250 MG/5ML
1000 SOLUTION ORAL EVERY 12 HOURS SCHEDULED
Status: DISCONTINUED | OUTPATIENT
Start: 2024-02-08 | End: 2024-02-15

## 2024-02-08 RX ORDER — QUETIAPINE FUMARATE 25 MG/1
50 TABLET, FILM COATED ORAL NIGHTLY
Status: DISCONTINUED | OUTPATIENT
Start: 2024-02-08 | End: 2024-02-09

## 2024-02-08 RX ADMIN — VALPROIC ACID 1000 MG: 250 SOLUTION ORAL at 12:09

## 2024-02-08 RX ADMIN — QUETIAPINE FUMARATE 50 MG: 25 TABLET ORAL at 21:57

## 2024-02-08 RX ADMIN — VALPROIC ACID 1000 MG: 250 SOLUTION ORAL at 21:57

## 2024-02-08 NOTE — ED NOTES
Assumed care of patient from Lucas RN at this time. Pt is resting on stretcher with 1:1 at bedside.
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
## 2024-02-08 NOTE — ED NOTES
Pt remains in green gown laying on ED stretcher at this time. Calm and cooperative.    1:1 with safety sitter.

## 2024-02-09 PROBLEM — F29 PSYCHOTIC DISORDER WITH DELUSIONS (HCC): Status: ACTIVE | Noted: 2024-02-09

## 2024-02-09 PROBLEM — F39 MOOD DISORDER (HCC): Status: ACTIVE | Noted: 2024-02-09

## 2024-02-09 PROCEDURE — 6370000000 HC RX 637 (ALT 250 FOR IP): Performed by: STUDENT IN AN ORGANIZED HEALTH CARE EDUCATION/TRAINING PROGRAM

## 2024-02-09 PROCEDURE — 1240000000 HC EMOTIONAL WELLNESS R&B

## 2024-02-09 RX ORDER — ZIPRASIDONE MESYLATE 20 MG/ML
20 INJECTION, POWDER, LYOPHILIZED, FOR SOLUTION INTRAMUSCULAR EVERY 12 HOURS PRN
Status: DISCONTINUED | OUTPATIENT
Start: 2024-02-09 | End: 2024-02-15 | Stop reason: HOSPADM

## 2024-02-09 RX ORDER — MAGNESIUM HYDROXIDE/ALUMINUM HYDROXICE/SIMETHICONE 120; 1200; 1200 MG/30ML; MG/30ML; MG/30ML
30 SUSPENSION ORAL EVERY 6 HOURS PRN
Status: DISCONTINUED | OUTPATIENT
Start: 2024-02-09 | End: 2024-02-15 | Stop reason: HOSPADM

## 2024-02-09 RX ORDER — TRAZODONE HYDROCHLORIDE 50 MG/1
50 TABLET ORAL NIGHTLY PRN
Status: DISCONTINUED | OUTPATIENT
Start: 2024-02-09 | End: 2024-02-15 | Stop reason: HOSPADM

## 2024-02-09 RX ORDER — ACETAMINOPHEN 325 MG/1
650 TABLET ORAL EVERY 4 HOURS PRN
Status: DISCONTINUED | OUTPATIENT
Start: 2024-02-09 | End: 2024-02-15 | Stop reason: HOSPADM

## 2024-02-09 RX ORDER — QUETIAPINE FUMARATE 25 MG/1
50 TABLET, FILM COATED ORAL 2 TIMES DAILY
Status: DISCONTINUED | OUTPATIENT
Start: 2024-02-09 | End: 2024-02-10

## 2024-02-09 RX ORDER — HYDROXYZINE 50 MG/1
50 TABLET, FILM COATED ORAL 3 TIMES DAILY PRN
Status: DISCONTINUED | OUTPATIENT
Start: 2024-02-09 | End: 2024-02-15 | Stop reason: HOSPADM

## 2024-02-09 RX ADMIN — QUETIAPINE FUMARATE 50 MG: 25 TABLET ORAL at 21:22

## 2024-02-09 RX ADMIN — VALPROIC ACID 1000 MG: 250 SOLUTION ORAL at 21:21

## 2024-02-09 RX ADMIN — VALPROIC ACID 1000 MG: 250 SOLUTION ORAL at 09:01

## 2024-02-09 NOTE — PLAN OF CARE
Problem: Discharge Planning  Goal: Discharge to home or other facility with appropriate resources  Outcome: Progressing     Problem: Coping  Goal: Pt/Family able to verbalize concerns and demonstrate effective coping strategies  Description: INTERVENTIONS:  1. Assist patient/family to identify coping skills, available support systems and cultural and spiritual values  2. Provide emotional support, including active listening and acknowledgement of concerns of patient and caregivers  3. Reduce environmental stimuli, as able  4. Instruct patient/family in relaxation techniques, as appropriate  5. Assess for spiritual pain/suffering and initiate Spiritual Care, Psychosocial Clinical Specialist consults as needed  Outcome: Progressing     Problem: Behavior  Goal: Pt/Family maintain appropriate behavior and adhere to behavioral management agreement, if implemented  Description: INTERVENTIONS:  1. Assess patient/family's coping skills and  non-compliant behavior (including use of illegal substances)  2. Notify security of behavior or suspected illegal substances which indicate the need for search of the family and/or belongings  3. Encourage verbalization of thoughts and concerns in a socially appropriate manner  4. Utilize positive, consistent limit setting strategies supporting safety of patient, staff and others  5. Encourage participation in the decision making process about the behavioral management agreement  6. If a visitor's behavior poses a threat to safety call refer to organization policy.  7. Initiate consult with , Psychosocial CNS, Spiritual Care as appropriate  Outcome: Progressing     Problem: Involuntary Admit  Goal: Will cooperate with staff recommendations and doctor's orders and will demonstrate appropriate behavior  Description: INTERVENTIONS:  1. Treat underlying conditions and offer medication as ordered  2. Educate regarding involuntary admission procedures and rules  3. Contain

## 2024-02-09 NOTE — ED NOTES
Patient called out asking for snacks. Food and beverages provided, patient is calm and cooperative with no complaints. Sitter at doorway, room is psych safe, pt in green gown.   Awaiting d19 bed placement.

## 2024-02-09 NOTE — ED NOTES
Pt observed sleeping at this time. Room is psych safe, belongings secured at nurse's station, pt in green gown.

## 2024-02-09 NOTE — BSMART NOTE
Per Nikole with [facility], pt accepted by Dr Zheng to 237/1 pending TDO arrival.  Elliot MORRISON aware

## 2024-02-09 NOTE — GROUP NOTE
Group Therapy Note    Date: 2/9/2024    Group Start Time: 1600  Group End Time: 1640  Group Topic: Recreational    SSR 2 BH NON ACUTE    Rody Snaches        Group Therapy Note    Facilitated leisure skills group to reinforce positive coping and to manage mood through music, social interaction, group activities and art task       Attendees: 3/6      Notes:  Pt came to group when it was getting ready to end    Discipline Responsible: Recreational Therapist      Signature:  Rody Sanches, TAMEKAS

## 2024-02-09 NOTE — ED NOTES
TRANSFER - OUT REPORT:    Verbal report given to Airam Paez  being transferred to  for routine progression of patient care       Report consisted of patient's Situation, Background, Assessment and   Recommendations(SBAR).     Information from the following report(s) Nurse Handoff Report, Index, ED Encounter Summary, Adult Overview, and Recent Results was reviewed with the receiving nurse.    [place] Fall Assessment:    Presents to emergency department  because of falls (Syncope, seizure, or loss of consciousness): No  Age > 70: No  Altered Mental Status, Intoxication with alcohol or substance confusion (Disorientation, impaired judgment, poor safety awaremess, or inability to follow instructions): No  Impaired Mobility: Ambulates or transfers with assistive devices or assistance; Unable to ambulate or transer.: No             Lines:       Opportunity for questions and clarification was provided.      Patient transported with:  [employer]

## 2024-02-10 LAB
ALBUMIN SERPL-MCNC: 3.3 G/DL (ref 3.5–5)
ALBUMIN/GLOB SERPL: 0.8 (ref 1.1–2.2)
ALP SERPL-CCNC: 40 U/L (ref 45–117)
ALT SERPL-CCNC: 69 U/L (ref 12–78)
AST SERPL W P-5'-P-CCNC: 35 U/L (ref 15–37)
BILIRUB DIRECT SERPL-MCNC: <0.1 MG/DL (ref 0–0.2)
BILIRUB SERPL-MCNC: 0.2 MG/DL (ref 0.2–1)
GLOBULIN SER CALC-MCNC: 4.2 G/DL (ref 2–4)
PROT SERPL-MCNC: 7.5 G/DL (ref 6.4–8.2)
VALPROATE SERPL-MCNC: 114 UG/ML (ref 50–100)

## 2024-02-10 PROCEDURE — 36415 COLL VENOUS BLD VENIPUNCTURE: CPT

## 2024-02-10 PROCEDURE — 6370000000 HC RX 637 (ALT 250 FOR IP): Performed by: PSYCHIATRY & NEUROLOGY

## 2024-02-10 PROCEDURE — 80076 HEPATIC FUNCTION PANEL: CPT

## 2024-02-10 PROCEDURE — 80164 ASSAY DIPROPYLACETIC ACD TOT: CPT

## 2024-02-10 PROCEDURE — 6370000000 HC RX 637 (ALT 250 FOR IP): Performed by: HOSPITALIST

## 2024-02-10 PROCEDURE — 6370000000 HC RX 637 (ALT 250 FOR IP): Performed by: STUDENT IN AN ORGANIZED HEALTH CARE EDUCATION/TRAINING PROGRAM

## 2024-02-10 PROCEDURE — G0425 INPT/ED TELECONSULT30: HCPCS | Performed by: PSYCHIATRY & NEUROLOGY

## 2024-02-10 PROCEDURE — 1240000000 HC EMOTIONAL WELLNESS R&B

## 2024-02-10 RX ORDER — MIDAZOLAM HYDROCHLORIDE 5 MG/ML
10 INJECTION, SOLUTION INTRAMUSCULAR; INTRAVENOUS PRN
Status: DISCONTINUED | OUTPATIENT
Start: 2024-02-10 | End: 2024-02-15 | Stop reason: HOSPADM

## 2024-02-10 RX ORDER — OLANZAPINE 5 MG/1
5 TABLET, ORALLY DISINTEGRATING ORAL NIGHTLY
Status: DISCONTINUED | OUTPATIENT
Start: 2024-02-10 | End: 2024-02-15 | Stop reason: HOSPADM

## 2024-02-10 RX ORDER — LEVETIRACETAM 250 MG/1
1000 TABLET ORAL
Status: COMPLETED | OUTPATIENT
Start: 2024-02-10 | End: 2024-02-10

## 2024-02-10 RX ADMIN — VALPROIC ACID 1000 MG: 250 SOLUTION ORAL at 21:20

## 2024-02-10 RX ADMIN — QUETIAPINE FUMARATE 50 MG: 25 TABLET ORAL at 21:20

## 2024-02-10 RX ADMIN — VALPROIC ACID 1000 MG: 250 SOLUTION ORAL at 11:09

## 2024-02-10 RX ADMIN — LEVETIRACETAM 1000 MG: 250 TABLET ORAL at 03:42

## 2024-02-10 RX ADMIN — QUETIAPINE FUMARATE 50 MG: 25 TABLET ORAL at 11:38

## 2024-02-10 NOTE — GROUP NOTE
Group Therapy Note    Date: 2/10/2024    Group Start Time: 1520  Group End Time: 1615  Group Topic: Recreational    SSR 2 BH NON ACUTE    Rody Sanches        Group Therapy Note    Facilitated leisure skills group to reinforce positive coping and to manage mood through music, social interaction, group activities and art task       Attendees: 3/9       Notes:  Did not attend. Pt was asleep    Discipline Responsible: Recreational Therapist      Signature:  ALKA Suarez
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
## 2024-02-11 PROCEDURE — 1240000000 HC EMOTIONAL WELLNESS R&B

## 2024-02-11 PROCEDURE — 6370000000 HC RX 637 (ALT 250 FOR IP): Performed by: PSYCHIATRY & NEUROLOGY

## 2024-02-11 PROCEDURE — 6370000000 HC RX 637 (ALT 250 FOR IP): Performed by: STUDENT IN AN ORGANIZED HEALTH CARE EDUCATION/TRAINING PROGRAM

## 2024-02-11 RX ADMIN — VALPROIC ACID 1000 MG: 250 SOLUTION ORAL at 08:51

## 2024-02-11 RX ADMIN — OLANZAPINE 5 MG: 5 TABLET, ORALLY DISINTEGRATING ORAL at 20:58

## 2024-02-11 RX ADMIN — VALPROIC ACID 1000 MG: 250 SOLUTION ORAL at 20:58

## 2024-02-11 NOTE — GROUP NOTE
Group Therapy Note    Date: 2/11/2024    Group Start Time: 1123  Group End Time: 1200  Group Topic: Education Group - Inpatient    SSR 2 BH NON ACUTE    Rody Sanches        Group Therapy Note    Facilitated group to focus on understanding the importance of healthy boundaries and developing healthy boundaries to help improve relationships       Attendees: 2/9      Notes:  Did not attend. Pt was in shower    Discipline Responsible: Recreational Therapist      Signature:  ALKA Suarez
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
## 2024-02-11 NOTE — GROUP NOTE
Group Therapy Note    Date: 2/11/2024    Group Start Time: 1505  Group End Time: 1555  Group Topic: Recreational    SSR 2  NON ACUTE    Rody Sanches        Group Therapy Note    Facilitated leisure skills group to reinforce positive coping and to manage mood through music, social interaction, group activities and art task       Attendees: 5/9       Patient's Goal:  Attend group daily    Notes:  Pt was receptive to listening to music and a song he selected. Interacted with staff. Declined to work on leisure task      Status After Intervention:  Unchanged    Participation Level: Active Listener and Interactive    Participation Quality: Appropriate      Speech:  normal      Thought Process/Content: Perseverating      Affective Functioning: Congruent      Mood:  Calm      Level of consciousness:  Alert      Response to Learning: Progressing to goal      Endings: None Reported    Modes of Intervention: Socialization and Activity      Discipline Responsible: Recreational Therapist      Signature:  ALKA Suarez

## 2024-02-11 NOTE — PLAN OF CARE
Problem: Discharge Planning  Goal: Discharge to home or other facility with appropriate resources  2/11/2024 0248 by Priyanka Presley RN  Outcome: Not Progressing  2/11/2024 0234 by Priyanka Presley RN  Outcome: Not Progressing     Problem: Coping  Goal: Pt/Family able to verbalize concerns and demonstrate effective coping strategies  Description: INTERVENTIONS:  1. Assist patient/family to identify coping skills, available support systems and cultural and spiritual values  2. Provide emotional support, including active listening and acknowledgement of concerns of patient and caregivers  3. Reduce environmental stimuli, as able  4. Instruct patient/family in relaxation techniques, as appropriate  5. Assess for spiritual pain/suffering and initiate Spiritual Care, Psychosocial Clinical Specialist consults as needed  2/11/2024 0248 by Priyanka Presley RN  Outcome: Not Progressing  2/11/2024 0234 by Priyanka Presley RN  Outcome: Not Progressing     Problem: Decision Making  Goal: Pt/Family able to effectively weigh alternatives and participate in decision making related to treatment and care  Description: INTERVENTIONS:  1. Determine when there are differences between patient's view, family's view, and healthcare provider's view of condition  2. Facilitate patient and family articulation of goals for care  3. Help patient and family identify pros/cons of alternative solutions  4. Provide information as requested by patient/family  5. Respect patient/family right to receive or not to receive information  6. Serve as a liaison between patient and family and health care team  7. Initiate Consults from Ethics, Palliative Care or initiate Family Care Conference as is appropriate  2/11/2024 0248 by Priyanka Presley RN  Outcome: Not Progressing  2/11/2024 0234 by Priyanka Presley RN  Outcome: Not Progressing     Problem: Behavior  Goal: Pt/Family maintain appropriate behavior and adhere to behavioral management agreement,

## 2024-02-11 NOTE — PLAN OF CARE
Problem: Discharge Planning  Goal: Discharge to home or other facility with appropriate resources  Outcome: Not Progressing     Problem: Coping  Goal: Pt/Family able to verbalize concerns and demonstrate effective coping strategies  Description: INTERVENTIONS:  1. Assist patient/family to identify coping skills, available support systems and cultural and spiritual values  2. Provide emotional support, including active listening and acknowledgement of concerns of patient and caregivers  3. Reduce environmental stimuli, as able  4. Instruct patient/family in relaxation techniques, as appropriate  5. Assess for spiritual pain/suffering and initiate Spiritual Care, Psychosocial Clinical Specialist consults as needed  Outcome: Not Progressing     Problem: Decision Making  Goal: Pt/Family able to effectively weigh alternatives and participate in decision making related to treatment and care  Description: INTERVENTIONS:  1. Determine when there are differences between patient's view, family's view, and healthcare provider's view of condition  2. Facilitate patient and family articulation of goals for care  3. Help patient and family identify pros/cons of alternative solutions  4. Provide information as requested by patient/family  5. Respect patient/family right to receive or not to receive information  6. Serve as a liaison between patient and family and health care team  7. Initiate Consults from Ethics, Palliative Care or initiate Family Care Conference as is appropriate  Outcome: Not Progressing     Problem: Involuntary Admit  Goal: Will cooperate with staff recommendations and doctor's orders and will demonstrate appropriate behavior  Description: INTERVENTIONS:  1. Treat underlying conditions and offer medication as ordered  2. Educate regarding involuntary admission procedures and rules  3. Contain excessive/inappropriate behavior per unit and hospital policies  Outcome: Not Progressing

## 2024-02-12 PROCEDURE — 6370000000 HC RX 637 (ALT 250 FOR IP): Performed by: STUDENT IN AN ORGANIZED HEALTH CARE EDUCATION/TRAINING PROGRAM

## 2024-02-12 PROCEDURE — 6370000000 HC RX 637 (ALT 250 FOR IP): Performed by: PSYCHIATRY & NEUROLOGY

## 2024-02-12 PROCEDURE — 1240000000 HC EMOTIONAL WELLNESS R&B

## 2024-02-12 RX ADMIN — VALPROIC ACID 1000 MG: 250 SOLUTION ORAL at 08:48

## 2024-02-12 RX ADMIN — VALPROIC ACID 1000 MG: 250 SOLUTION ORAL at 21:13

## 2024-02-12 RX ADMIN — OLANZAPINE 5 MG: 5 TABLET, ORALLY DISINTEGRATING ORAL at 21:12

## 2024-02-12 NOTE — GROUP NOTE
Group Therapy Note    Date: 2/12/2024    Group Start Time: 1125  Group End Time: 1155  Group Topic: Education Group - Inpatient    SSR 2  NON ACUTE    Rody Sanches        Group Therapy Note    Facilitated group to introduce the definition of self-esteem and discuss information relating to creating steps to greater self-appreciation and recognizing symptoms of self-defeat       Attendees: 4/10      Notes:  Encouraged but did not attend    Discipline Responsible: Recreational Therapist      Signature:  ALKA Suarez

## 2024-02-12 NOTE — GROUP NOTE
Group Therapy Note    Date: 2/12/2024    Group Start Time: 1300  Group End Time: 1335  Group Topic: Process Group - Inpatient    SSR 2 [facility]    César Perera        Group Therapy Note: This writer facilitated a group where the topic of \"self care\" was discussed.     Attendees: 2       Patient's Goal:  to attend groups    Notes:  Pt was encouraged to attend but did not.       Signature:  César Perera

## 2024-02-12 NOTE — GROUP NOTE
Group Therapy Note    Date: 2/12/2024    Group Start Time: 1555  Group End Time: 1645  Group Topic: Recreational    SSR 2 BH NON ACUTE    Rody Sanches        Group Therapy Note    Facilitated leisure skills group to reinforce positive coping and to manage mood through music, social interaction, group activities and art task       Attendees: 6/11       Notes:  Encouraged but did not attend    Discipline Responsible: Recreational Therapist      Signature:  ALKA Suarez

## 2024-02-13 PROCEDURE — 6370000000 HC RX 637 (ALT 250 FOR IP): Performed by: STUDENT IN AN ORGANIZED HEALTH CARE EDUCATION/TRAINING PROGRAM

## 2024-02-13 PROCEDURE — 1240000000 HC EMOTIONAL WELLNESS R&B

## 2024-02-13 PROCEDURE — 6370000000 HC RX 637 (ALT 250 FOR IP): Performed by: PSYCHIATRY & NEUROLOGY

## 2024-02-13 PROCEDURE — 6360000002 HC RX W HCPCS: Performed by: PSYCHIATRY & NEUROLOGY

## 2024-02-13 RX ADMIN — OLANZAPINE 5 MG: 5 TABLET, ORALLY DISINTEGRATING ORAL at 20:27

## 2024-02-13 RX ADMIN — VALPROIC ACID 1000 MG: 250 SOLUTION ORAL at 20:27

## 2024-02-13 RX ADMIN — VALPROIC ACID 1000 MG: 250 SOLUTION ORAL at 08:53

## 2024-02-13 RX ADMIN — MIDAZOLAM 10 MG: 5 INJECTION INTRAMUSCULAR; INTRAVENOUS at 05:20

## 2024-02-13 NOTE — PLAN OF CARE
Problem: Discharge Planning  Goal: Discharge to home or other facility with appropriate resources  Outcome: Not Progressing     Problem: Coping  Goal: Pt/Family able to verbalize concerns and demonstrate effective coping strategies  Description: INTERVENTIONS:  1. Assist patient/family to identify coping skills, available support systems and cultural and spiritual values  2. Provide emotional support, including active listening and acknowledgement of concerns of patient and caregivers  3. Reduce environmental stimuli, as able  4. Instruct patient/family in relaxation techniques, as appropriate  5. Assess for spiritual pain/suffering and initiate Spiritual Care, Psychosocial Clinical Specialist consults as needed  Outcome: Not Progressing     Problem: Decision Making  Goal: Pt/Family able to effectively weigh alternatives and participate in decision making related to treatment and care  Description: INTERVENTIONS:  1. Determine when there are differences between patient's view, family's view, and healthcare provider's view of condition  2. Facilitate patient and family articulation of goals for care  3. Help patient and family identify pros/cons of alternative solutions  4. Provide information as requested by patient/family  5. Respect patient/family right to receive or not to receive information  6. Serve as a liaison between patient and family and health care team  7. Initiate Consults from Ethics, Palliative Care or initiate Family Care Conference as is appropriate  Outcome: Not Progressing     Problem: Behavior  Goal: Pt/Family maintain appropriate behavior and adhere to behavioral management agreement, if implemented  Description: INTERVENTIONS:  1. Assess patient/family's coping skills and  non-compliant behavior (including use of illegal substances)  2. Notify security of behavior or suspected illegal substances which indicate the need for search of the family and/or belongings  3. Encourage verbalization of

## 2024-02-14 PROCEDURE — 36415 COLL VENOUS BLD VENIPUNCTURE: CPT

## 2024-02-14 PROCEDURE — 1240000000 HC EMOTIONAL WELLNESS R&B

## 2024-02-14 PROCEDURE — 80164 ASSAY DIPROPYLACETIC ACD TOT: CPT

## 2024-02-14 PROCEDURE — 6370000000 HC RX 637 (ALT 250 FOR IP): Performed by: PSYCHIATRY & NEUROLOGY

## 2024-02-14 PROCEDURE — 6370000000 HC RX 637 (ALT 250 FOR IP): Performed by: STUDENT IN AN ORGANIZED HEALTH CARE EDUCATION/TRAINING PROGRAM

## 2024-02-14 RX ADMIN — VALPROIC ACID 1000 MG: 250 SOLUTION ORAL at 20:57

## 2024-02-14 RX ADMIN — VALPROIC ACID 1000 MG: 250 SOLUTION ORAL at 08:39

## 2024-02-14 RX ADMIN — OLANZAPINE 5 MG: 5 TABLET, ORALLY DISINTEGRATING ORAL at 20:59

## 2024-02-14 NOTE — GROUP NOTE
Group Therapy Note    Date: 2/14/2024    Group Start Time: 1315  Group End Time: 1400  Group Topic: Process Group - Inpatient    SSR 2 [facility] ACUTE    Ledy Al MSW        Group Therapy Note: Facilitator encouraged group members to discuss the impacts of depression and ways to build happiness.     Attendees: 3       Patient's Goal:  Pt was encouraged to attend group but declined.     Signature:  RYLEE ZELAYA

## 2024-02-14 NOTE — GROUP NOTE
Group Therapy Note    Date: 2/13/2024    Group Start Time: 1945  Group End Time: 2030  Group Topic: Recreational    SSR 2  NON ACUTE    Zohreh Guzman        Group Therapy Note    Attendees: 3/9    Recreational Therapist facilitated structured leisure skills group to introduce healthy leisure skills as positive way to cope and manage mood.           Notes:  Did not attend group despite encouragement    Status After Intervention:  Improved    Participation Level: Active Listener    Participation Quality: Appropriate      Speech:  normal      Thought Process/Content: Logical      Affective Functioning: Congruent      Mood:  calm       Level of consciousness:  Attentive      Response to Learning: Progressing to goal      Endings: None Reported    Modes of Intervention: Activity      Discipline Responsible: Recreational Therapist      Signature:  ALKA Deras

## 2024-02-15 VITALS
SYSTOLIC BLOOD PRESSURE: 104 MMHG | WEIGHT: 250 LBS | DIASTOLIC BLOOD PRESSURE: 78 MMHG | HEART RATE: 82 BPM | HEIGHT: 70 IN | OXYGEN SATURATION: 89 % | RESPIRATION RATE: 17 BRPM | BODY MASS INDEX: 35.79 KG/M2 | TEMPERATURE: 97.5 F

## 2024-02-15 LAB — VALPROATE SERPL-MCNC: 127 UG/ML (ref 50–100)

## 2024-02-15 PROCEDURE — 6370000000 HC RX 637 (ALT 250 FOR IP): Performed by: STUDENT IN AN ORGANIZED HEALTH CARE EDUCATION/TRAINING PROGRAM

## 2024-02-15 RX ORDER — DIVALPROEX SODIUM 500 MG/1
1000 TABLET, DELAYED RELEASE ORAL EVERY 12 HOURS SCHEDULED
Status: DISCONTINUED | OUTPATIENT
Start: 2024-02-15 | End: 2024-02-15 | Stop reason: HOSPADM

## 2024-02-15 RX ORDER — DIVALPROEX SODIUM 500 MG/1
1000 TABLET, DELAYED RELEASE ORAL EVERY 12 HOURS SCHEDULED
Qty: 120 TABLET | Refills: 0 | Status: SHIPPED | OUTPATIENT
Start: 2024-02-15

## 2024-02-15 RX ORDER — OLANZAPINE 5 MG/1
5 TABLET, ORALLY DISINTEGRATING ORAL NIGHTLY
Qty: 30 TABLET | Refills: 3 | Status: SHIPPED | OUTPATIENT
Start: 2024-02-15

## 2024-02-15 RX ADMIN — VALPROIC ACID 1000 MG: 250 SOLUTION ORAL at 08:24

## 2024-02-15 NOTE — PROGRESS NOTES
PSYCHIATRIC PROGRESS NOTE         Patient Name  Maico Paez   Date of Birth 1995   [facility] [id number]   Medical Record Number  [id number]      Age  28 y.o.   PCP Trey Zapata   Admit date:  2/7/2024    Room Number  238/02   [facility]   Date of Service  2/10/2024            HISTORY OF PRESENT ILLNESS/INTERVAL HISTORY:              MENTAL STATUS EXAM & VITALS                    VITALS:     Patient Vitals for the past 24 hrs:   Temp Pulse Resp BP SpO2   02/10/24 1928 98.4 °F (36.9 °C) 72 18 91/64 (!) 89 %   02/10/24 1121 -- 78 16 114/85 100 %   02/10/24 0223 -- 97 24 (!) 111/55 98 %     Wt Readings from Last 3 Encounters:   02/09/24 113.4 kg (250 lb)     Temp Readings from Last 3 Encounters:   02/10/24 98.4 °F (36.9 °C) (Oral)     BP Readings from Last 3 Encounters:   02/10/24 91/64     Pulse Readings from Last 3 Encounters:   02/10/24 72            DATA     LABORATORY DATA:(reviewed/updated 2/10/2024)  Recent Results (from the past 24 hour(s))   Hepatic Function Panel    Collection Time: 02/10/24  3:00 AM   Result Value Ref Range    Total Protein 7.5 6.4 - 8.2 g/dL    Albumin 3.3 (L) 3.5 - 5.0 g/dL    Globulin 4.2 (H) 2.0 - 4.0 g/dL    Albumin/Globulin Ratio 0.8 (L) 1.1 - 2.2      Total Bilirubin 0.2 0.2 - 1.0 mg/dL    Bilirubin, Direct <0.1 0.0 - 0.2 mg/dL    Alk Phosphatase 40 (L) 45 - 117 U/L    AST 35 15 - 37 U/L    ALT 69 12 - 78 U/L   Valproic Acid Level, Total    Collection Time: 02/10/24  3:00 AM   Result Value Ref Range    Valproic Acid 114 (H) 50 - 100 ug/mL      Lab Results   Component Value Date/Time    VALAC 114 02/10/2024 03:00 AM    VALP 63 08/02/2022 07:27 PM     No results found for: \"LITHM\"   RADIOLOGY REPORTS:(reviewed/updated 2/10/2024)  No results found.       MEDICATIONS     ALL MEDICATIONS:   Current Facility-Administered Medications   Medication Dose Route Frequency    midazolam PF (VERSED) injection 10 mg  10 mg IntraMUSCular PRN    OLANZapine zydis (ZYPREXA) 
PSYCHIATRIC PROGRESS NOTE         Patient Name  Maico Paez   Date of Birth 1995   [facility] [id number]   Medical Record Number  [id number]      Age  28 y.o.   PCP Trey Zapata   Admit date:  2/7/2024    Room Number  238/02   [facility]   Date of Service  2/12/2024            HISTORY OF PRESENT ILLNESS/INTERVAL HISTORY:              MENTAL STATUS EXAM & VITALS                    VITALS:     Patient Vitals for the past 24 hrs:   Temp Pulse Resp BP   02/12/24 1904 97.8 °F (36.6 °C) 70 18 118/70   02/12/24 0715 97.5 °F (36.4 °C) 64 17 115/68     Wt Readings from Last 3 Encounters:   02/09/24 113.4 kg (250 lb)     Temp Readings from Last 3 Encounters:   02/12/24 97.8 °F (36.6 °C) (Oral)     BP Readings from Last 3 Encounters:   02/12/24 118/70     Pulse Readings from Last 3 Encounters:   02/12/24 70            DATA     LABORATORY DATA:(reviewed/updated 2/12/2024)  No results found for this or any previous visit (from the past 24 hour(s)).   Lab Results   Component Value Date/Time    VALAC 114 02/10/2024 03:00 AM    VALP 63 08/02/2022 07:27 PM     No results found for: \"LITHM\"   RADIOLOGY REPORTS:(reviewed/updated 2/12/2024)  No results found.       MEDICATIONS     ALL MEDICATIONS:   Current Facility-Administered Medications   Medication Dose Route Frequency    midazolam PF (VERSED) injection 10 mg  10 mg IntraMUSCular PRN    OLANZapine zydis (ZYPREXA) disintegrating tablet 5 mg  5 mg Oral Nightly    acetaminophen (TYLENOL) tablet 650 mg  650 mg Oral Q4H PRN    hydrOXYzine HCl (ATARAX) tablet 50 mg  50 mg Oral TID PRN    traZODone (DESYREL) tablet 50 mg  50 mg Oral Nightly PRN    magnesium hydroxide (MILK OF MAGNESIA) 400 MG/5ML suspension 30 mL  30 mL Oral Daily PRN    aluminum & magnesium hydroxide-simethicone (MAALOX) 200-200-20 MG/5ML suspension 30 mL  30 mL Oral Q6H PRN    ziprasidone (GEODON) injection 20 mg  20 mg IntraMUSCular Q12H PRN    valproic acid (DEPAKENE) 250 MG/5ML oral solution 
Patient observed in the bathroom at this time and is steady on his feet. 1:1 staff continues to monitor for safety.  
Progress note    February 14, 2024 patient seen for follow-up PSYCHIATRIC PROGRESS NOTE         Patient Name  Maico Paez   Date of Birth 1995   [facility] [id number]   Medical Record Number  [id number]      Age  28 y.o.   PCP Trey Zapata   Admit date:  2/7/2024    Room Number  238/02   [facility]   Date of Service  2/14/2024            HISTORY OF PRESENT ILLNESS/INTERVAL HISTORY:              MENTAL STATUS EXAM & VITALS                    VITALS:     Patient Vitals for the past 24 hrs:   Temp Pulse Resp BP   02/14/24 1906 97.9 °F (36.6 °C) 77 18 112/84   02/14/24 0748 97.5 °F (36.4 °C) 80 17 108/88     Wt Readings from Last 3 Encounters:   02/09/24 113.4 kg (250 lb)     Temp Readings from Last 3 Encounters:   02/14/24 97.9 °F (36.6 °C) (Oral)     BP Readings from Last 3 Encounters:   02/14/24 112/84     Pulse Readings from Last 3 Encounters:   02/14/24 77            DATA     LABORATORY DATA:(reviewed/updated 2/14/2024)  No results found for this or any previous visit (from the past 24 hour(s)).   Lab Results   Component Value Date/Time    VALAC 114 02/10/2024 03:00 AM    VALP 63 08/02/2022 07:27 PM     No results found for: \"LITHM\"   RADIOLOGY REPORTS:(reviewed/updated 2/14/2024)  No results found.       MEDICATIONS     ALL MEDICATIONS:   Current Facility-Administered Medications   Medication Dose Route Frequency    midazolam PF (VERSED) injection 10 mg  10 mg IntraMUSCular PRN    OLANZapine zydis (ZYPREXA) disintegrating tablet 5 mg  5 mg Oral Nightly    acetaminophen (TYLENOL) tablet 650 mg  650 mg Oral Q4H PRN    hydrOXYzine HCl (ATARAX) tablet 50 mg  50 mg Oral TID PRN    traZODone (DESYREL) tablet 50 mg  50 mg Oral Nightly PRN    magnesium hydroxide (MILK OF MAGNESIA) 400 MG/5ML suspension 30 mL  30 mL Oral Daily PRN    aluminum & magnesium hydroxide-simethicone (MAALOX) 200-200-20 MG/5ML suspension 30 mL  30 mL Oral Q6H PRN    ziprasidone (GEODON) injection 20 mg  20 mg IntraMUSCular 
  SCHEDULED MEDICATIONS:    OLANZapine zydis  5 mg Oral Nightly    valproic acid  1,000 mg Oral 2 times per day           ASSESSMENT & PLAN   Progress note    February 13, 2024 patient seen for follow-up chart reviewed patient is out of his room in the day room with the peers and nursing staff and took his medication with much persuasion no agitation or aggression no complaints very minimal verbal interaction.  In a commitment hearing tomorrow continued inpatient level of care indicated reassess the case tomorrow thank you c      I certify that this patient's inpatient psychiatric hospital services continue to be, required for treatment that could reasonably be expected to improve the patient's condition and that the patient continues to need, on a daily basis, active treatment furnished directly by or requiring the supervision of inpatient psychiatric facility personnel. In addition, the hospital records show that services furnished were intensive treatment services.    Signed By:   Harlan Zheng MD  2/13/2024            
1,000 mg Oral 2 times per day      SCHEDULED MEDICATIONS:    OLANZapine zydis  5 mg Oral Nightly    valproic acid  1,000 mg Oral 2 times per day           ASSESSMENT & PLAN   Progress note    February 11, 2024 patient seen for follow-up chart reviewed patient needing some prompting redirection medication taking medication vital signs no seizures getting out of the room going through the musical watching TV one-to-one no seizures no agitation or aggression some disorganization rambling looseness of association hard to communicate insight poor judgment is poor but no agitation or aggression continued inpatient level of care indicated thank  Continue close observation  Discussed meds  Provided support, psycho edu  Discussed with staff in the treatment team, the progress made in therapy, psychosocial needs and nursing concerns.    The following regarding medications was addressed during rounds with patient:   the risks and benefits of the proposed medication.   The patient was given the opportunity to ask questions.   Informed consent given to the use of the above medications.     Obtain psychiatric records from previous [facility] hospitals to further elucidate the nature of patient's psychopathology and review once available.    Gather additional collateral information from friends, family and o/p treatment team to further elucidate the nature of patient's psychopathology and baselline level of psychiatric functioning.         I certify that this patient's inpatient psychiatric hospital services continue to be, required for treatment that could reasonably be expected to improve the patient's condition and that the patient continues to need, on a daily basis, active treatment furnished directly by or requiring the supervision of inpatient psychiatric facility personnel. In addition, the hospital records show that services furnished were intensive treatment services.    Signed By:   Harlan Zheng MD  2/11/2024

## 2024-02-15 NOTE — GROUP NOTE
Group Therapy Note    Date: 2/15/2024    Group Start Time: 1120  Group End Time: 1135  Group Topic: Education Group - Inpatient    SSR 2  NON ACUTE    Rody Sanches        Group Therapy Note    Facilitated group to focus on defining different types of defense mechanisms and being able to recognize some defenses that was used to cope with stress and anxiety      Attendees: 1/8      Notes:  Encouraged but did not attend    Discipline Responsible: Recreational Therapist      Signature:  ALKA Suarez

## 2024-02-15 NOTE — GROUP NOTE
Group Therapy Note    Date: 2/14/2024    Group Start Time: 1945  Group End Time: 2030  Group Topic: Recreational    SSR 2  NON ACUTE    Zohreh Guzman        Group Therapy Note    Attendees: 4/8    Recreational Therapist facilitated structured leisure skills group to introduce healthy leisure skills as positive way to cope and manage mood.         Patient's Goal:  attend groups daily    Notes: Attended group and listened to songs with peers.  Pt was receptive to intervention and responded to prompts from staff.      Status After Intervention:  Improved    Participation Level: Active Listener    Participation Quality: Appropriate      Speech:  normal      Thought Process/Content: Logical      Affective Functioning: Congruent      Mood:  calm       Level of consciousness:  Alert      Response to Learning: Progressing to goal      Endings: None Reported    Modes of Intervention: Activity      Discipline Responsible: Recreational Therapist      Signature:  ALKA Deras

## 2024-02-15 NOTE — BH NOTE
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 any AVH yet today. Pt reports mild knee pain with origin prior to coming to the hospital. Pt unable to give a numeric rating for pain. Pt minimal with responses but cooperative at this time. Pt denied the need for tylenol prn for pain. Pt able to ambulate without difficulty.1:1 staff continued to ensure pt safety on unit.     1320- Pt laying in bed quietly with 1:1 staff present. Pt resting with eyes closed, resp even and unlabored, no signs of physical distress.    1501- Pt continues resting in bed quietly with 1:1 staff present. Pt resting with eyes closed, respirations even and unlabored, no signs of physical distress.     1638- Pt resting in bed quietly with 1:1 staff present. Pt did not get up for dinner but was given the tray in his room. Pt resting with eyes closed, respirations even and unlabored, no signs of physical distress.     1754- Pt laying in bed awake at this time and stated he was going to go to the bathroom and eat his dinner. Pt denies any medical or psychological distress. 1:1 sitter remains at bedside.

## 2024-03-12 ENCOUNTER — HOSPITAL ENCOUNTER (EMERGENCY)
Facility: HOSPITAL | Age: 29
Discharge: HOME | DRG: 101 | End: 2024-03-13
Attending: EMERGENCY MEDICINE
Payer: MEDICAID

## 2024-03-12 DIAGNOSIS — R46.89 AGGRESSIVE BEHAVIOR: Primary | ICD-10-CM

## 2024-03-12 DIAGNOSIS — J10.1 INFLUENZA B: ICD-10-CM

## 2024-03-12 PROCEDURE — 96372 THER/PROPH/DIAG INJ SC/IM: CPT

## 2024-03-12 PROCEDURE — 99284 EMERGENCY DEPT VISIT MOD MDM: CPT

## 2024-03-12 ASSESSMENT — PAIN - FUNCTIONAL ASSESSMENT: PAIN_FUNCTIONAL_ASSESSMENT: NONE - DENIES PAIN

## 2024-03-13 VITALS
RESPIRATION RATE: 18 BRPM | HEIGHT: 70 IN | OXYGEN SATURATION: 100 % | BODY MASS INDEX: 35.87 KG/M2 | HEART RATE: 92 BPM | DIASTOLIC BLOOD PRESSURE: 92 MMHG | SYSTOLIC BLOOD PRESSURE: 132 MMHG | TEMPERATURE: 98 F

## 2024-03-13 LAB
ALBUMIN SERPL-MCNC: 3.6 G/DL (ref 3.5–5)
ALBUMIN/GLOB SERPL: 0.8 (ref 1.1–2.2)
ALP SERPL-CCNC: 41 U/L (ref 45–117)
ALT SERPL-CCNC: 29 U/L (ref 12–78)
ANION GAP SERPL CALC-SCNC: 4 MMOL/L (ref 5–15)
AST SERPL W P-5'-P-CCNC: 16 U/L (ref 15–37)
BASOPHILS # BLD: 0 K/UL (ref 0–0.1)
BASOPHILS NFR BLD: 0 % (ref 0–1)
BILIRUB SERPL-MCNC: 0.6 MG/DL (ref 0.2–1)
BUN SERPL-MCNC: 15 MG/DL (ref 6–20)
BUN/CREAT SERPL: 31 (ref 12–20)
CA-I BLD-MCNC: 9.5 MG/DL (ref 8.5–10.1)
CHLORIDE SERPL-SCNC: 109 MMOL/L (ref 97–108)
CO2 SERPL-SCNC: 27 MMOL/L (ref 21–32)
CREAT SERPL-MCNC: 0.49 MG/DL (ref 0.7–1.3)
DIFFERENTIAL METHOD BLD: ABNORMAL
EOSINOPHIL # BLD: 0.3 K/UL (ref 0–0.4)
EOSINOPHIL NFR BLD: 5 % (ref 0–7)
ERYTHROCYTE [DISTWIDTH] IN BLOOD BY AUTOMATED COUNT: 12.5 % (ref 11.5–14.5)
FLUAV RNA SPEC QL NAA+PROBE: NOT DETECTED
FLUBV RNA SPEC QL NAA+PROBE: DETECTED
GLOBULIN SER CALC-MCNC: 4.4 G/DL (ref 2–4)
GLUCOSE SERPL-MCNC: 81 MG/DL (ref 65–100)
HCT VFR BLD AUTO: 40.8 % (ref 36.6–50.3)
HGB BLD-MCNC: 12.6 G/DL (ref 12.1–17)
IMM GRANULOCYTES # BLD AUTO: 0 K/UL
IMM GRANULOCYTES NFR BLD AUTO: 0 %
LYMPHOCYTES # BLD: 2.9 K/UL (ref 0.8–3.5)
LYMPHOCYTES NFR BLD: 53 % (ref 12–49)
MAGNESIUM SERPL-MCNC: 2 MG/DL (ref 1.6–2.4)
MCH RBC QN AUTO: 23.5 PG (ref 26–34)
MCHC RBC AUTO-ENTMCNC: 30.9 G/DL (ref 30–36.5)
MCV RBC AUTO: 76 FL (ref 80–99)
MONOCYTES # BLD: 0.3 K/UL (ref 0–1)
MONOCYTES NFR BLD: 6 % (ref 5–13)
NEUTS SEG # BLD: 1.9 K/UL (ref 1.8–8)
NEUTS SEG NFR BLD: 36 % (ref 32–75)
NRBC # BLD: 0 K/UL (ref 0–0.01)
NRBC BLD-RTO: 0 PER 100 WBC
PLATELET # BLD AUTO: 173 K/UL (ref 150–400)
PMV BLD AUTO: 10.5 FL (ref 8.9–12.9)
POTASSIUM SERPL-SCNC: 4.1 MMOL/L (ref 3.5–5.1)
PROT SERPL-MCNC: 8 G/DL (ref 6.4–8.2)
RBC # BLD AUTO: 5.37 M/UL (ref 4.1–5.7)
RBC MORPH BLD: ABNORMAL
SARS-COV-2 RNA RESP QL NAA+PROBE: NOT DETECTED
SODIUM SERPL-SCNC: 140 MMOL/L (ref 136–145)
WBC # BLD AUTO: 5.4 K/UL (ref 4.1–11.1)

## 2024-03-13 PROCEDURE — 36415 COLL VENOUS BLD VENIPUNCTURE: CPT

## 2024-03-13 PROCEDURE — 85025 COMPLETE CBC W/AUTO DIFF WBC: CPT

## 2024-03-13 PROCEDURE — 80053 COMPREHEN METABOLIC PANEL: CPT

## 2024-03-13 PROCEDURE — 87636 SARSCOV2 & INF A&B AMP PRB: CPT

## 2024-03-13 PROCEDURE — 83735 ASSAY OF MAGNESIUM: CPT

## 2024-03-13 PROCEDURE — 6360000002 HC RX W HCPCS: Performed by: STUDENT IN AN ORGANIZED HEALTH CARE EDUCATION/TRAINING PROGRAM

## 2024-03-13 RX ORDER — LORAZEPAM 2 MG/ML
2 INJECTION INTRAMUSCULAR ONCE
Status: COMPLETED | OUTPATIENT
Start: 2024-03-13 | End: 2024-03-13

## 2024-03-13 RX ORDER — OSELTAMIVIR PHOSPHATE 75 MG/1
75 CAPSULE ORAL 2 TIMES DAILY
Qty: 10 CAPSULE | Refills: 0 | Status: SHIPPED | OUTPATIENT
Start: 2024-03-13 | End: 2024-03-13

## 2024-03-13 RX ORDER — HALOPERIDOL 5 MG/ML
5 INJECTION INTRAMUSCULAR EVERY 6 HOURS PRN
Status: DISCONTINUED | OUTPATIENT
Start: 2024-03-13 | End: 2024-03-13

## 2024-03-13 RX ORDER — OSELTAMIVIR PHOSPHATE 6 MG/ML
75 FOR SUSPENSION ORAL 2 TIMES DAILY
Qty: 125 ML | Refills: 0 | Status: ON HOLD | OUTPATIENT
Start: 2024-03-13 | End: 2024-03-17 | Stop reason: HOSPADM

## 2024-03-13 RX ORDER — HYDROXYZINE HYDROCHLORIDE 25 MG/1
25 TABLET, FILM COATED ORAL EVERY 8 HOURS PRN
Qty: 30 TABLET | Refills: 0 | Status: SHIPPED | OUTPATIENT
Start: 2024-03-13 | End: 2024-03-13

## 2024-03-13 RX ORDER — HYDROXYZINE HCL 10 MG/5 ML
25 SOLUTION, ORAL ORAL 2 TIMES DAILY PRN
Qty: 600 ML | Refills: 0 | Status: SHIPPED | OUTPATIENT
Start: 2024-03-13 | End: 2024-03-16 | Stop reason: ALTCHOICE

## 2024-03-13 RX ORDER — HALOPERIDOL 5 MG/ML
5 INJECTION INTRAMUSCULAR ONCE
Status: COMPLETED | OUTPATIENT
Start: 2024-03-13 | End: 2024-03-13

## 2024-03-13 RX ORDER — OSELTAMIVIR PHOSPHATE 75 MG/1
75 CAPSULE ORAL ONCE
Status: DISCONTINUED | OUTPATIENT
Start: 2024-03-13 | End: 2024-03-13 | Stop reason: HOSPADM

## 2024-03-13 RX ADMIN — HALOPERIDOL LACTATE 5 MG: 5 INJECTION, SOLUTION INTRAMUSCULAR at 02:40

## 2024-03-13 RX ADMIN — LORAZEPAM 2 MG: 2 INJECTION INTRAMUSCULAR; INTRAVENOUS at 02:40

## 2024-03-13 ASSESSMENT — PAIN - FUNCTIONAL ASSESSMENT: PAIN_FUNCTIONAL_ASSESSMENT: NONE - DENIES PAIN

## 2024-03-13 NOTE — ED NOTES
Pt allowed me to get blood work and give medications. Pt did not want to do any further at this time
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
## 2024-03-13 NOTE — ED NOTES
Patient is being admitted to the Behavioral Health Unit at 2nd floor, room 232. Report given to Elba SIMMS and patient is leaving stable accompanied by a sitter and escorted by a security personnel.

## 2024-03-13 NOTE — ED PROVIDER NOTES
[facility] EMERGENCY DEPT  EMERGENCY DEPARTMENT HISTORY AND PHYSICAL EXAM      Date: 3/12/2024  Patient Name: Maico Paez  MRN: [id number]  YOB: 1995  Date of evaluation: 3/12/2024  Provider: Maico Childs MD   Note Started: 9:29 PM EDT 3/12/24    HISTORY OF PRESENT ILLNESS     Chief Complaint   Patient presents with    Mental Health Problem       History Provided By: Patient and caregiver    HPI: Maico Paez is a 28 y.o. male is brought to be evaluated for increased aggressive behavior at his group home.  Caregiver also reports visual hallucinations.  History of prior psychiatric admissions for the same.    PAST MEDICAL HISTORY   Past Medical History:  Past Medical History:   Diagnosis Date    Aggressive outburst     Anxiety disorder     Depression     Seizures (HCC)     Suicidal thoughts        Past Surgical History:  History reviewed. No pertinent surgical history.    Family History:  History reviewed. No pertinent family history.    Social History:  Social History     Tobacco Use    Smoking status: Never    Smokeless tobacco: Never   Substance Use Topics    Alcohol use: Never    Drug use: Not Currently       Allergies:  No Known Allergies    PCP: Trey Zapata    Current Meds:   No current facility-administered medications for this encounter.     Current Outpatient Medications   Medication Sig Dispense Refill    divalproex (DEPAKOTE) 500 MG DR tablet Take 2 tablets by mouth every 12 hours 120 tablet 0    OLANZapine zydis (ZYPREXA) 5 MG disintegrating tablet Take 1 tablet by mouth nightly 30 tablet 3       Social Determinants of Health:   Social Determinants of Health     Tobacco Use: Low Risk  (3/12/2024)    Patient History     Smoking Tobacco Use: Never     Smokeless Tobacco Use: Never     Passive Exposure: Not on file   Alcohol Use: Not At Risk (2/9/2024)    AUDIT-C     Frequency of Alcohol Consumption: Never     Average Number of Drinks: Patient does not drink     Frequency of  admission    Records Reviewed (source and summary of external notes): Prior medical records and Nursing notes    Vitals:    Vitals:    03/12/24 2104   BP: (!) 139/94   Pulse: 100   Resp: 20   Temp: 97.9 °F (36.6 °C)   TempSrc: Oral   SpO2: 98%   Height: 1.778 m (5' 10\")        ED COURSE  ED Course as of 03/18/24 1440   Wed Mar 13, 2024   0316 CBC does not show any evidence of acute process. Leukocytosis not present to suggest infection. Hemoglobin not suggestive of acute anemia.  [SS]   0357 No significant electrolyte derangements. Creatinine is not elevated more than baseline range making ROB unlikely. No significant transaminitis noted. Normal bilirubin.    Magnesium within normal limits.   [SS]   0558 Flu positive. [SS]      ED Course User Index  [SS] Eduard Mendez MD       SEPSIS Reassessment: Sepsis reassessment not applicable    Clinical Management Tools:  Not Applicable    Patient was given the following medications:  Medications - No data to display    CONSULTS: See ED Course/MDM for further details.  None     Social Determinants affecting Diagnosis/Treatment: None    Smoking Cessation: Not Applicable    PROCEDURES   Unless otherwise noted above, none.  Procedures      CRITICAL CARE TIME   Patient does not meet Critical Care Time, 0 minutes    ED IMPRESSION     1. Aggressive behavior    2. Influenza B          DISPOSITION/PLAN   DISPOSITION      Discharge Note: The patient is stable for discharge home. The signs, symptoms, diagnosis, and discharge instructions have been discussed, understanding conveyed, and agreed upon. The patient is to follow up as recommended or return to ER should their symptoms worsen.      PATIENT REFERRED TO:  No follow-up provider specified.      DISCHARGE MEDICATIONS:     Medication List        ASK your doctor about these medications      divalproex 500 MG DR tablet  Commonly known as: DEPAKOTE  Take 2 tablets by mouth every 12 hours     OLANZapine zydis 5 MG disintegrating

## 2024-03-13 NOTE — ED NOTES
Spoke with aliya valenzuela who runs the group home. Discharge reviewed with mr valenzuela.  They will come to get him in about an hour.

## 2024-03-13 NOTE — ED NOTES
Patient asked to get into a green gown and to be wanded by security. Patient reluctant to get into gown. Patient was assisted by the group home's representative. Patient began swinging toward group home representative and RN. Security and provider at bedside. MD Vanessa advises RN to allow patient to calm down and reassess at a later time

## 2024-03-13 NOTE — DISCHARGE INSTRUCTIONS
the pharmacy. If you have any questions or reservations about taking the medication due to side effects or interactions with other medications, please call your primary care physician or contact the ER to speak with the charge nurse.     Make an appointment with your family doctor or the physician you were referred to for follow-up of this visit as instructed on your discharge paperwork, as this is a mandatory follow-up. Return to the ER if you are unable to be seen or if you are unable to be seen in a timely manner.    If you have any problem arranging the follow-up visit, contact the Emergency Department immediately.

## 2024-03-15 ENCOUNTER — HOSPITAL ENCOUNTER (INPATIENT)
Facility: HOSPITAL | Age: 29
LOS: 2 days | Discharge: HOME | DRG: 101 | End: 2024-03-18
Attending: STUDENT IN AN ORGANIZED HEALTH CARE EDUCATION/TRAINING PROGRAM | Admitting: HOSPITALIST
Payer: MEDICAID

## 2024-03-15 ENCOUNTER — APPOINTMENT (OUTPATIENT)
Facility: HOSPITAL | Age: 29
DRG: 101 | End: 2024-03-15
Payer: MEDICAID

## 2024-03-15 DIAGNOSIS — G40.919 BREAKTHROUGH SEIZURE (HCC): Primary | ICD-10-CM

## 2024-03-15 LAB
ALBUMIN SERPL-MCNC: 3.7 G/DL (ref 3.5–5)
ALBUMIN/GLOB SERPL: 0.8 (ref 1.1–2.2)
ALP SERPL-CCNC: 44 U/L (ref 45–117)
ALT SERPL-CCNC: 30 U/L (ref 12–78)
ANION GAP SERPL CALC-SCNC: 4 MMOL/L (ref 5–15)
AST SERPL W P-5'-P-CCNC: 19 U/L (ref 15–37)
BASOPHILS # BLD: 0 K/UL (ref 0–0.1)
BASOPHILS NFR BLD: 0 % (ref 0–1)
BILIRUB SERPL-MCNC: 0.6 MG/DL (ref 0.2–1)
BUN SERPL-MCNC: 16 MG/DL (ref 6–20)
BUN/CREAT SERPL: 28 (ref 12–20)
CA-I BLD-MCNC: 9.5 MG/DL (ref 8.5–10.1)
CHLORIDE SERPL-SCNC: 111 MMOL/L (ref 97–108)
CO2 SERPL-SCNC: 26 MMOL/L (ref 21–32)
CREAT SERPL-MCNC: 0.57 MG/DL (ref 0.7–1.3)
DIFFERENTIAL METHOD BLD: ABNORMAL
EOSINOPHIL # BLD: 0.2 K/UL (ref 0–0.4)
EOSINOPHIL NFR BLD: 4 % (ref 0–7)
ERYTHROCYTE [DISTWIDTH] IN BLOOD BY AUTOMATED COUNT: 12.4 % (ref 11.5–14.5)
GLOBULIN SER CALC-MCNC: 4.5 G/DL (ref 2–4)
GLUCOSE SERPL-MCNC: 84 MG/DL (ref 65–100)
HCT VFR BLD AUTO: 40.2 % (ref 36.6–50.3)
HGB BLD-MCNC: 12.8 G/DL (ref 12.1–17)
IMM GRANULOCYTES # BLD AUTO: 0 K/UL (ref 0–0.04)
IMM GRANULOCYTES NFR BLD AUTO: 0 % (ref 0–0.5)
LYMPHOCYTES # BLD: 2.3 K/UL (ref 0.8–3.5)
LYMPHOCYTES NFR BLD: 38 % (ref 12–49)
MCH RBC QN AUTO: 23.2 PG (ref 26–34)
MCHC RBC AUTO-ENTMCNC: 31.8 G/DL (ref 30–36.5)
MCV RBC AUTO: 73 FL (ref 80–99)
MONOCYTES # BLD: 0.3 K/UL (ref 0–1)
MONOCYTES NFR BLD: 6 % (ref 5–13)
NEUTS SEG # BLD: 3.1 K/UL (ref 1.8–8)
NEUTS SEG NFR BLD: 52 % (ref 32–75)
NRBC # BLD: 0 K/UL (ref 0–0.01)
NRBC BLD-RTO: 0 PER 100 WBC
PLATELET # BLD AUTO: 232 K/UL (ref 150–400)
PMV BLD AUTO: 11 FL (ref 8.9–12.9)
POTASSIUM SERPL-SCNC: 3.8 MMOL/L (ref 3.5–5.1)
PROT SERPL-MCNC: 8.2 G/DL (ref 6.4–8.2)
RBC # BLD AUTO: 5.51 M/UL (ref 4.1–5.7)
SODIUM SERPL-SCNC: 141 MMOL/L (ref 136–145)
VALPROATE SERPL-MCNC: 73 UG/ML (ref 50–100)
WBC # BLD AUTO: 6 K/UL (ref 4.1–11.1)

## 2024-03-15 PROCEDURE — 80053 COMPREHEN METABOLIC PANEL: CPT

## 2024-03-15 PROCEDURE — 82607 VITAMIN B-12: CPT

## 2024-03-15 PROCEDURE — 80164 ASSAY DIPROPYLACETIC ACD TOT: CPT

## 2024-03-15 PROCEDURE — 99285 EMERGENCY DEPT VISIT HI MDM: CPT

## 2024-03-15 PROCEDURE — 82746 ASSAY OF FOLIC ACID SERUM: CPT

## 2024-03-15 PROCEDURE — 85025 COMPLETE CBC W/AUTO DIFF WBC: CPT

## 2024-03-15 PROCEDURE — 70450 CT HEAD/BRAIN W/O DYE: CPT

## 2024-03-15 PROCEDURE — 36415 COLL VENOUS BLD VENIPUNCTURE: CPT

## 2024-03-15 ASSESSMENT — LIFESTYLE VARIABLES
HOW OFTEN DO YOU HAVE A DRINK CONTAINING ALCOHOL: NEVER
HOW MANY STANDARD DRINKS CONTAINING ALCOHOL DO YOU HAVE ON A TYPICAL DAY: PATIENT DOES NOT DRINK

## 2024-03-15 ASSESSMENT — PAIN - FUNCTIONAL ASSESSMENT: PAIN_FUNCTIONAL_ASSESSMENT: NONE - DENIES PAIN

## 2024-03-15 NOTE — ED PROVIDER NOTES
Applicable    Patient was given the following medications:  Medications   levETIRAcetam (KEPPRA) 3,000 mg in sodium chloride 0.9 % 100 mL IVPB (has no administration in time range)       CONSULTS: See ED Course/MDM for further details.  IP CONSULT TO TELE-NEUROLOGY     Social Determinants affecting Diagnosis/Treatment: None    Smoking Cessation: Not Applicable    PROCEDURES   Unless otherwise noted above, none.  Procedures      CRITICAL CARE TIME   Patient does not meet Critical Care Time, 0 minutes    ED IMPRESSION     1. Breakthrough seizure (HCC)          DISPOSITION/PLAN   DISPOSITION Decision To Admit 03/16/2024 01:00:40 AM    Admit Note: Pt is being admitted by hospitalist. The results of their tests and reason(s) for their admission have been discussed with pt and/or available family. They convey agreement and understanding for the need to be admitted and for the admission diagnosis.     PATIENT REFERRED TO:  No follow-up provider specified.      DISCHARGE MEDICATIONS:     Medication List        ASK your doctor about these medications      divalproex 500 MG DR tablet  Commonly known as: DEPAKOTE  Take 2 tablets by mouth every 12 hours     hydrOXYzine 10 MG/5ML syrup  Commonly known as: ATARAX  Take 12.5 mLs by mouth 2 times daily as needed for Itching     OLANZapine zydis 5 MG disintegrating tablet  Commonly known as: ZYPREXA  Take 1 tablet by mouth nightly     oseltamivir 6mg/ml 6 MG/ML Susr suspension  Commonly known as: Tamiflu  Take 12.5 mLs by mouth 2 times daily for 5 days                DISCONTINUED MEDICATIONS:  Current Discharge Medication List          I am the Primary Clinician of Record: Francisco Miramontes MD (electronically signed)    (Please note that parts of this dictation were completed with voice recognition software. Quite often unanticipated grammatical, syntax, homophones, and other interpretive errors are inadvertently transcribed by the computer software. Please disregards these errors.

## 2024-03-15 NOTE — ED TRIAGE NOTES
Pt from [facility] for seizures and aggression with his post ictal state, staff endorses h/o seizures and that he's had x3 in the last 5hr. Seen lately for MH crisis

## 2024-03-16 PROBLEM — G40.409 TONIC-CLONIC EPILEPTIC SEIZURES (HCC): Status: ACTIVE | Noted: 2024-03-16

## 2024-03-16 PROBLEM — R56.9 SEIZURE (HCC): Status: ACTIVE | Noted: 2024-03-16

## 2024-03-16 LAB
ALBUMIN SERPL-MCNC: 3.8 G/DL (ref 3.5–5)
ANION GAP SERPL CALC-SCNC: 4 MMOL/L (ref 5–15)
BUN SERPL-MCNC: 15 MG/DL (ref 6–20)
BUN/CREAT SERPL: 29 (ref 12–20)
CA-I BLD-MCNC: 9.6 MG/DL (ref 8.5–10.1)
CHLORIDE SERPL-SCNC: 112 MMOL/L (ref 97–108)
CK SERPL-CCNC: 304 U/L (ref 39–308)
CO2 SERPL-SCNC: 26 MMOL/L (ref 21–32)
CREAT SERPL-MCNC: 0.52 MG/DL (ref 0.7–1.3)
GLUCOSE BLD STRIP.AUTO-MCNC: 81 MG/DL (ref 65–100)
GLUCOSE SERPL-MCNC: 100 MG/DL (ref 65–100)
PERFORMED BY:: NORMAL
PHOSPHATE SERPL-MCNC: 2.4 MG/DL (ref 2.6–4.7)
POTASSIUM SERPL-SCNC: 4.1 MMOL/L (ref 3.5–5.1)
PROLACTIN SERPL-MCNC: 5.9 NG/ML
SODIUM SERPL-SCNC: 142 MMOL/L (ref 136–145)
TSH SERPL DL<=0.05 MIU/L-ACNC: 1.82 UIU/ML (ref 0.36–3.74)
VIT B12 SERPL-MCNC: 700 PG/ML (ref 193–986)

## 2024-03-16 PROCEDURE — 84443 ASSAY THYROID STIM HORMONE: CPT

## 2024-03-16 PROCEDURE — 96365 THER/PROPH/DIAG IV INF INIT: CPT

## 2024-03-16 PROCEDURE — 84146 ASSAY OF PROLACTIN: CPT

## 2024-03-16 PROCEDURE — 36415 COLL VENOUS BLD VENIPUNCTURE: CPT

## 2024-03-16 PROCEDURE — 2580000003 HC RX 258: Performed by: HOSPITALIST

## 2024-03-16 PROCEDURE — 6360000002 HC RX W HCPCS: Performed by: STUDENT IN AN ORGANIZED HEALTH CARE EDUCATION/TRAINING PROGRAM

## 2024-03-16 PROCEDURE — 2580000003 HC RX 258: Performed by: STUDENT IN AN ORGANIZED HEALTH CARE EDUCATION/TRAINING PROGRAM

## 2024-03-16 PROCEDURE — 6370000000 HC RX 637 (ALT 250 FOR IP): Performed by: HOSPITALIST

## 2024-03-16 PROCEDURE — 1100000000 HC RM PRIVATE

## 2024-03-16 PROCEDURE — 82962 GLUCOSE BLOOD TEST: CPT

## 2024-03-16 PROCEDURE — 80069 RENAL FUNCTION PANEL: CPT

## 2024-03-16 PROCEDURE — 82550 ASSAY OF CK (CPK): CPT

## 2024-03-16 RX ORDER — SODIUM CHLORIDE 0.9 % (FLUSH) 0.9 %
5-40 SYRINGE (ML) INJECTION EVERY 12 HOURS SCHEDULED
Status: DISCONTINUED | OUTPATIENT
Start: 2024-03-16 | End: 2024-03-18 | Stop reason: HOSPADM

## 2024-03-16 RX ORDER — ONDANSETRON 4 MG/1
4 TABLET, ORALLY DISINTEGRATING ORAL EVERY 8 HOURS PRN
Status: DISCONTINUED | OUTPATIENT
Start: 2024-03-16 | End: 2024-03-18 | Stop reason: HOSPADM

## 2024-03-16 RX ORDER — MULTIVITAMIN WITH IRON
1 TABLET ORAL DAILY
Status: DISCONTINUED | OUTPATIENT
Start: 2024-03-16 | End: 2024-03-18 | Stop reason: HOSPADM

## 2024-03-16 RX ORDER — KETOROLAC TROMETHAMINE 30 MG/ML
30 INJECTION, SOLUTION INTRAMUSCULAR; INTRAVENOUS EVERY 6 HOURS PRN
Status: DISCONTINUED | OUTPATIENT
Start: 2024-03-16 | End: 2024-03-18 | Stop reason: HOSPADM

## 2024-03-16 RX ORDER — OLANZAPINE 5 MG/1
5 TABLET, ORALLY DISINTEGRATING ORAL NIGHTLY
Status: DISCONTINUED | OUTPATIENT
Start: 2024-03-16 | End: 2024-03-18 | Stop reason: HOSPADM

## 2024-03-16 RX ORDER — QUETIAPINE FUMARATE 25 MG/1
50 TABLET, FILM COATED ORAL 3 TIMES DAILY
Status: DISCONTINUED | OUTPATIENT
Start: 2024-03-16 | End: 2024-03-18 | Stop reason: HOSPADM

## 2024-03-16 RX ORDER — OSELTAMIVIR PHOSPHATE 6 MG/ML
75 FOR SUSPENSION ORAL 2 TIMES DAILY
Status: COMPLETED | OUTPATIENT
Start: 2024-03-16 | End: 2024-03-17

## 2024-03-16 RX ORDER — HYDROXYZINE HYDROCHLORIDE 25 MG/1
25 TABLET, FILM COATED ORAL EVERY 8 HOURS PRN
COMMUNITY
Start: 2024-03-13

## 2024-03-16 RX ORDER — LORAZEPAM 2 MG/ML
2 INJECTION INTRAMUSCULAR EVERY 4 HOURS PRN
Status: DISCONTINUED | OUTPATIENT
Start: 2024-03-16 | End: 2024-03-18 | Stop reason: HOSPADM

## 2024-03-16 RX ORDER — LEVETIRACETAM 250 MG/1
1000 TABLET ORAL 2 TIMES DAILY
Status: DISCONTINUED | OUTPATIENT
Start: 2024-03-16 | End: 2024-03-18 | Stop reason: HOSPADM

## 2024-03-16 RX ORDER — IBUPROFEN 200 MG
600 TABLET ORAL EVERY 6 HOURS PRN
Status: DISCONTINUED | OUTPATIENT
Start: 2024-03-16 | End: 2024-03-18 | Stop reason: HOSPADM

## 2024-03-16 RX ORDER — ACETAMINOPHEN 650 MG/1
650 SUPPOSITORY RECTAL EVERY 6 HOURS PRN
Status: DISCONTINUED | OUTPATIENT
Start: 2024-03-16 | End: 2024-03-18 | Stop reason: HOSPADM

## 2024-03-16 RX ORDER — ONDANSETRON 2 MG/ML
4 INJECTION INTRAMUSCULAR; INTRAVENOUS EVERY 6 HOURS PRN
Status: DISCONTINUED | OUTPATIENT
Start: 2024-03-16 | End: 2024-03-18 | Stop reason: HOSPADM

## 2024-03-16 RX ORDER — SODIUM CHLORIDE 9 MG/ML
INJECTION, SOLUTION INTRAVENOUS PRN
Status: DISCONTINUED | OUTPATIENT
Start: 2024-03-16 | End: 2024-03-18 | Stop reason: HOSPADM

## 2024-03-16 RX ORDER — LEVETIRACETAM 500 MG/5ML
INJECTION, SOLUTION, CONCENTRATE INTRAVENOUS
Status: DISCONTINUED
Start: 2024-03-16 | End: 2024-03-16 | Stop reason: WASHOUT

## 2024-03-16 RX ORDER — HYDROXYZINE HYDROCHLORIDE 25 MG/1
25 TABLET, FILM COATED ORAL EVERY 8 HOURS PRN
Status: DISCONTINUED | OUTPATIENT
Start: 2024-03-16 | End: 2024-03-18 | Stop reason: HOSPADM

## 2024-03-16 RX ORDER — DIVALPROEX SODIUM 500 MG/1
1000 TABLET, DELAYED RELEASE ORAL EVERY 12 HOURS SCHEDULED
Status: DISCONTINUED | OUTPATIENT
Start: 2024-03-16 | End: 2024-03-18 | Stop reason: HOSPADM

## 2024-03-16 RX ORDER — QUETIAPINE FUMARATE 50 MG/1
50 TABLET, FILM COATED ORAL 3 TIMES DAILY
COMMUNITY
Start: 2024-03-14

## 2024-03-16 RX ORDER — ACETAMINOPHEN 325 MG/1
650 TABLET ORAL EVERY 6 HOURS PRN
Status: DISCONTINUED | OUTPATIENT
Start: 2024-03-16 | End: 2024-03-18 | Stop reason: HOSPADM

## 2024-03-16 RX ORDER — SODIUM CHLORIDE 0.9 % (FLUSH) 0.9 %
5-40 SYRINGE (ML) INJECTION PRN
Status: DISCONTINUED | OUTPATIENT
Start: 2024-03-16 | End: 2024-03-16

## 2024-03-16 RX ADMIN — HYDROXYZINE HYDROCHLORIDE 25 MG: 25 TABLET, FILM COATED ORAL at 20:19

## 2024-03-16 RX ADMIN — LEVETIRACETAM 1000 MG: 250 TABLET, FILM COATED ORAL at 09:20

## 2024-03-16 RX ADMIN — QUETIAPINE FUMARATE 50 MG: 25 TABLET ORAL at 20:19

## 2024-03-16 RX ADMIN — OSELTAMIVIR PHOSPHATE 75 MG: 6 POWDER, FOR SUSPENSION ORAL at 22:20

## 2024-03-16 RX ADMIN — DIVALPROEX SODIUM 1000 MG: 500 TABLET, DELAYED RELEASE ORAL at 20:18

## 2024-03-16 RX ADMIN — ACETAMINOPHEN 650 MG: 325 TABLET ORAL at 22:56

## 2024-03-16 RX ADMIN — LEVETIRACETAM 1000 MG: 250 TABLET, FILM COATED ORAL at 20:18

## 2024-03-16 RX ADMIN — OLANZAPINE 5 MG: 5 TABLET, ORALLY DISINTEGRATING ORAL at 20:19

## 2024-03-16 RX ADMIN — DIVALPROEX SODIUM 1000 MG: 500 TABLET, DELAYED RELEASE ORAL at 09:20

## 2024-03-16 RX ADMIN — QUETIAPINE FUMARATE 50 MG: 25 TABLET ORAL at 09:20

## 2024-03-16 RX ADMIN — THERA TABS 1 TABLET: TAB at 09:20

## 2024-03-16 RX ADMIN — SODIUM CHLORIDE 3000 MG: 9 INJECTION, SOLUTION INTRAVENOUS at 03:28

## 2024-03-16 RX ADMIN — SODIUM CHLORIDE, PRESERVATIVE FREE 10 ML: 5 INJECTION INTRAVENOUS at 09:44

## 2024-03-16 RX ADMIN — OSELTAMIVIR PHOSPHATE 75 MG: 6 POWDER, FOR SUSPENSION ORAL at 09:43

## 2024-03-16 ASSESSMENT — PAIN DESCRIPTION - LOCATION: LOCATION: HEAD

## 2024-03-16 ASSESSMENT — PAIN SCALES - GENERAL
PAINLEVEL_OUTOF10: 3
PAINLEVEL_OUTOF10: 0

## 2024-03-16 ASSESSMENT — PAIN - FUNCTIONAL ASSESSMENT
PAIN_FUNCTIONAL_ASSESSMENT: ACTIVITIES ARE NOT PREVENTED
PAIN_FUNCTIONAL_ASSESSMENT: NONE - DENIES PAIN

## 2024-03-16 ASSESSMENT — PAIN DESCRIPTION - DESCRIPTORS: DESCRIPTORS: ACHING;DULL

## 2024-03-16 ASSESSMENT — PAIN DESCRIPTION - ORIENTATION: ORIENTATION: ANTERIOR

## 2024-03-16 NOTE — ED NOTES
Olimpia Requested at this time as I walked to room to turn off pump and found patient walking around room  with his IV  pulled out and in his hand ,  Redirected patient back in bed .

## 2024-03-16 NOTE — PLAN OF CARE
Problem: Discharge Planning  Goal: Discharge to home or other facility with appropriate resources  Outcome: Progressing  Flowsheets (Taken 3/16/2024 1026)  Discharge to home or other facility with appropriate resources: Identify barriers to discharge with patient and caregiver     Problem: Skin/Tissue Integrity  Goal: Absence of new skin breakdown  Description: 1.  Monitor for areas of redness and/or skin breakdown  2.  Assess vascular access sites hourly  3.  Every 4-6 hours minimum:  Change oxygen saturation probe site  4.  Every 4-6 hours:  If on nasal continuous positive airway pressure, respiratory therapy assess nares and determine need for appliance change or resting period.  Outcome: Progressing     Problem: Neurosensory - Adult  Goal: Achieves stable or improved neurological status  Outcome: Progressing  Goal: Absence of seizures  Outcome: Progressing  Goal: Remains free of injury related to seizures activity  Outcome: Progressing  Goal: Achieves maximal functionality and self care  Outcome: Progressing

## 2024-03-16 NOTE — H&P
Hospitalist History & Physical Notes.           [facility].              Name : Maico Paez      MRN number : [id number]     YOB: 1995     Subjective :   Chief Complaint : Seizures recurrent.    Source of information : Mostly from the ED provider and nursing staff.  Patient caregiver left as he was had a long stay in the emergency room.  Patient is in deep sleep, unable to provide information.    History of present illness:   Maico Paez is  28 y.o. male with a history of developmental delay and intellectual disabilities, history of aggressive behavior psychosis requiring admissions, history of seizure disorder on treatment and follow-up outpatient with neurology Dr. Vaca is brought to the emergency room from the group home by the caregiver due to multiple episodes of seizures.  5 hours before presentation he had 3 times seizures, and patient was postictal.  In the emergency room he had seizures required treatment.  He was already on medications, but not sure about the complaints of the medications.  Patient was evaluated by the behavioral health, did not recommend any behavioral health interventions at this time.      He did not have any fever, chills.  Did not have any respiratory distress.  Denied any chest pain.  Requested for teleneurology consultation, ED provider discussed with the neurologist.  They going to evaluate him, meanwhile recommended to give Keppra and admit continue the Keppra orally.      Past Medical History:   Diagnosis Date    Aggressive outburst     Anxiety disorder     Depression     Seizures (HCC)     Suicidal thoughts      Past surgical history: None    Family history: Not available       Social History     Tobacco Use    Smoking status: Never    Smokeless tobacco: Never   Substance Use Topics    Alcohol use: Never           No Known Allergies       Current Facility-Administered Medications   Medication Dose Route Frequency Provider Last Rate

## 2024-03-16 NOTE — ED NOTES
Attempted to call Group home for Dr. CONKLIN for Medical records so she can admit him . Called twice with no answer . [phone]

## 2024-03-16 NOTE — ED NOTES
Spoke to Butch Roman   Who is primary Guardian of \" Gomez \" he states  that they have only had him a month  and he came from home with no med recs, the only thing they have is what we have here .   Jayme # [phone]

## 2024-03-16 NOTE — ED NOTES
ED TO INPATIENT SBAR HANDOFF    Patient Name: Maico Paez   Preferred Name: Maico  : 1995  28 y.o.   Family/Caregiver Present: no   Code Status Order: Full Code  PO Status: NO  Telemetry Order:   C-SSRS: Risk of Suicide: No Risk  Sitter no Safety  Restraints:     Sepsis Risk Score Sepsis Risk Score: 0.36    Situation  Chief Complaint   Patient presents with    Seizures    Influenza     Brief Description of Patient's Condition: Pt is alert and 0riented 2-3. Pt presented for c/o seizures. Pt has had a safety sitter due to pulling of cardiac monitor and bp cuff while in ED. Pt accepted all PO meds this morning without issue.   Mental Status: oriented  Arrived from:Group Home  Imaging:   CT Head W/O Contrast   Final Result   No acute intracranial abnormality.              Abnormal labs:   Abnormal Labs Reviewed   CBC WITH AUTO DIFFERENTIAL - Abnormal; Notable for the following components:       Result Value    MCV 73.0 (*)     MCH 23.2 (*)     All other components within normal limits   COMPREHENSIVE METABOLIC PANEL - Abnormal; Notable for the following components:    Chloride 111 (*)     Anion Gap 4 (*)     Creatinine 0.57 (*)     Bun/Cre Ratio 28 (*)     Alk Phosphatase 44 (*)     Globulin 4.5 (*)     Albumin/Globulin Ratio 0.8 (*)     All other components within normal limits       Background  Allergies: No Known Allergies  History:   Past Medical History:   Diagnosis Date    Aggressive outburst     Anxiety disorder     Depression     Seizures (HCC)     Suicidal thoughts        Assessment  Vitals: MEWS Score: 1  Level of Consciousness: Alert (0)   Vitals:    24 0109 24 0422 24 0613 24 0915   BP:   107/65 105/63   Pulse:  89 75 62   Resp:   16 14   Temp:   97.6 °F (36.4 °C) 97.8 °F (36.6 °C)   TempSrc:  Oral Oral Oral   SpO2:   98% 100%   Weight: 113.4 kg (250 lb)        Deterioration Index (DI): Deterioration Index: 15.53  Deterioration Index (DI) Interventions Performed:

## 2024-03-16 NOTE — PROGRESS NOTES
Patient came from the ED, in a full conscious state. Vital signs checked and recorded per chart. Patient made comfortable in bed

## 2024-03-16 NOTE — PROGRESS NOTES
Patient refused to take his due seroquel. Patient kept on standing and doesn't want to lay in his bed nor sit in the chair.

## 2024-03-17 LAB
BASOPHILS # BLD: 0 K/UL (ref 0–0.1)
BASOPHILS NFR BLD: 0 % (ref 0–1)
DIFFERENTIAL METHOD BLD: ABNORMAL
EOSINOPHIL # BLD: 0.4 K/UL (ref 0–0.4)
EOSINOPHIL NFR BLD: 5 % (ref 0–7)
ERYTHROCYTE [DISTWIDTH] IN BLOOD BY AUTOMATED COUNT: 12.3 % (ref 11.5–14.5)
HCT VFR BLD AUTO: 40.4 % (ref 36.6–50.3)
HGB BLD-MCNC: 12.5 G/DL (ref 12.1–17)
IMM GRANULOCYTES # BLD AUTO: 0 K/UL (ref 0–0.04)
IMM GRANULOCYTES NFR BLD AUTO: 0 % (ref 0–0.5)
LYMPHOCYTES # BLD: 3.7 K/UL (ref 0.8–3.5)
LYMPHOCYTES NFR BLD: 58 % (ref 12–49)
MCH RBC QN AUTO: 23.2 PG (ref 26–34)
MCHC RBC AUTO-ENTMCNC: 30.9 G/DL (ref 30–36.5)
MCV RBC AUTO: 75 FL (ref 80–99)
MONOCYTES # BLD: 0.4 K/UL (ref 0–1)
MONOCYTES NFR BLD: 6 % (ref 5–13)
NEUTS SEG # BLD: 2 K/UL (ref 1.8–8)
NEUTS SEG NFR BLD: 31 % (ref 32–75)
NRBC # BLD: 0 K/UL (ref 0–0.01)
NRBC BLD-RTO: 0 PER 100 WBC
PLATELET # BLD AUTO: 229 K/UL (ref 150–400)
PMV BLD AUTO: 11.3 FL (ref 8.9–12.9)
RBC # BLD AUTO: 5.39 M/UL (ref 4.1–5.7)
WBC # BLD AUTO: 6.5 K/UL (ref 4.1–11.1)

## 2024-03-17 PROCEDURE — 6370000000 HC RX 637 (ALT 250 FOR IP): Performed by: HOSPITALIST

## 2024-03-17 PROCEDURE — G0426 INPT/ED TELECONSULT50: HCPCS | Performed by: PSYCHIATRY & NEUROLOGY

## 2024-03-17 PROCEDURE — 1100000000 HC RM PRIVATE

## 2024-03-17 PROCEDURE — 36415 COLL VENOUS BLD VENIPUNCTURE: CPT

## 2024-03-17 PROCEDURE — 85025 COMPLETE CBC W/AUTO DIFF WBC: CPT

## 2024-03-17 RX ORDER — LEVETIRACETAM 500 MG/1
500 TABLET ORAL 2 TIMES DAILY
Qty: 60 TABLET | Refills: 1 | Status: SHIPPED | OUTPATIENT
Start: 2024-03-17 | End: 2024-03-18

## 2024-03-17 RX ORDER — DIVALPROEX SODIUM 500 MG/1
1000 TABLET, DELAYED RELEASE ORAL EVERY 12 HOURS SCHEDULED
Qty: 90 TABLET | Refills: 3 | Status: SHIPPED | OUTPATIENT
Start: 2024-03-17 | End: 2024-03-18

## 2024-03-17 RX ADMIN — DIVALPROEX SODIUM 1000 MG: 500 TABLET, DELAYED RELEASE ORAL at 08:08

## 2024-03-17 RX ADMIN — QUETIAPINE FUMARATE 50 MG: 25 TABLET ORAL at 08:09

## 2024-03-17 RX ADMIN — OLANZAPINE 5 MG: 5 TABLET, ORALLY DISINTEGRATING ORAL at 20:20

## 2024-03-17 RX ADMIN — THERA TABS 1 TABLET: TAB at 08:09

## 2024-03-17 RX ADMIN — LEVETIRACETAM 1000 MG: 250 TABLET, FILM COATED ORAL at 08:08

## 2024-03-17 RX ADMIN — QUETIAPINE FUMARATE 50 MG: 25 TABLET ORAL at 14:19

## 2024-03-17 RX ADMIN — DIVALPROEX SODIUM 1000 MG: 500 TABLET, DELAYED RELEASE ORAL at 20:21

## 2024-03-17 RX ADMIN — LEVETIRACETAM 1000 MG: 250 TABLET, FILM COATED ORAL at 20:20

## 2024-03-17 RX ADMIN — OSELTAMIVIR PHOSPHATE 75 MG: 6 POWDER, FOR SUSPENSION ORAL at 21:09

## 2024-03-17 RX ADMIN — OSELTAMIVIR PHOSPHATE 75 MG: 6 POWDER, FOR SUSPENSION ORAL at 09:39

## 2024-03-17 RX ADMIN — QUETIAPINE FUMARATE 50 MG: 25 TABLET ORAL at 20:20

## 2024-03-17 ASSESSMENT — PAIN SCALES - GENERAL: PAINLEVEL_OUTOF10: 0

## 2024-03-17 NOTE — CONSULTS
Impression/Recommendations:     Breakthrough seizures with a therapeutic Depakote level and influenza. The influenza may be sufficient trigger to lower seizure threshold.   However, I would continue Keppra along with the Depakote until he can see his Neurologist.   Discussed with the patient and with CHUCHO MIRAMONTES.  Thank you. He can go home from my viewpoint.   Total time: 55 minutes    HPI:   29 yo BM with developmental delay and associated behavior disorder and epilepsy. Presented to the ER on 3/15/24 after a flurry of seizures, on Depakote with a therapeutic level. He was found to have influenza.  Keppra was added in the ER.   He has had no more seizures since admission.     Neuro ROS  No headache, vertigo or vision change.  No difficulties with speech or swallowing.  No numbness, weakness or trouble with gait.      PMH:   Past Medical History:   Diagnosis Date    Aggressive outburst     Anxiety disorder     Depression     Seizures (HCC)     Suicidal thoughts      PSH: No past surgical history on file.   Allergies: None  FH: No family history on file.   SH:   Social History       Tobacco History       Smoking Status  Never      Smokeless Tobacco Use  Never              Alcohol History       Alcohol Use Status  Never              Drug Use       Drug Use Status  Not Currently              Sexual Activity       Sexually Active  Not Asked                   Meds:   Prior to Admission medications    Medication Sig Start Date End Date Taking? Authorizing Provider   QUEtiapine (SEROQUEL) 50 MG tablet Take 1 tablet by mouth 3 times daily 3/14/24  Yes ProviderPhil MD   hydrOXYzine HCl (ATARAX) 25 MG tablet Take 1 tablet by mouth every 8 hours as needed for Anxiety or Itching 3/13/24  Yes ProviderPhil MD   oseltamivir 6mg/ml (TAMIFLU) 6 MG/ML SUSR suspension Take 12.5 mLs by mouth 2 times daily for 5 days 3/13/24 3/18/24  Eduard Mendez MD   divalproex (DEPAKOTE) 500 MG DR tablet Take 2 tablets by mouth

## 2024-03-17 NOTE — CARE COORDINATION
Attempted to call Butch [email or web address] contact and \"lay caregiver\"   VM message with notification of DC and request for call back for initial assess assist.    Spoke with the pts primary RN, Stated this pt is not expected to DC until Monday 03/18/202.  He resides in a Group Home.  The Group Home name is unavailable at this time.  No answer to phone# listed with pt address  [phone].  No VM capability evident

## 2024-03-18 VITALS
HEIGHT: 70 IN | TEMPERATURE: 97.1 F | BODY MASS INDEX: 27.81 KG/M2 | HEART RATE: 84 BPM | DIASTOLIC BLOOD PRESSURE: 76 MMHG | SYSTOLIC BLOOD PRESSURE: 110 MMHG | OXYGEN SATURATION: 100 % | WEIGHT: 194.22 LBS | RESPIRATION RATE: 18 BRPM

## 2024-03-18 PROCEDURE — 6370000000 HC RX 637 (ALT 250 FOR IP): Performed by: HOSPITALIST

## 2024-03-18 RX ORDER — DIVALPROEX SODIUM 500 MG/1
1000 TABLET, DELAYED RELEASE ORAL EVERY 12 HOURS SCHEDULED
Qty: 90 TABLET | Refills: 3 | Status: SHIPPED | OUTPATIENT
Start: 2024-03-18

## 2024-03-18 RX ORDER — LEVETIRACETAM 500 MG/1
500 TABLET ORAL 2 TIMES DAILY
Qty: 60 TABLET | Refills: 1 | Status: SHIPPED | OUTPATIENT
Start: 2024-03-18

## 2024-03-18 RX ADMIN — LEVETIRACETAM 1000 MG: 250 TABLET, FILM COATED ORAL at 09:48

## 2024-03-18 RX ADMIN — DIVALPROEX SODIUM 1000 MG: 500 TABLET, DELAYED RELEASE ORAL at 09:47

## 2024-03-18 RX ADMIN — QUETIAPINE FUMARATE 50 MG: 25 TABLET ORAL at 09:48

## 2024-03-18 RX ADMIN — QUETIAPINE FUMARATE 50 MG: 25 TABLET ORAL at 13:32

## 2024-03-18 RX ADMIN — THERA TABS 1 TABLET: TAB at 09:47

## 2024-03-18 ASSESSMENT — PAIN SCALES - GENERAL: PAINLEVEL_OUTOF10: 0

## 2024-03-18 NOTE — PROGRESS NOTES
visited with patient in Room 564 for Leader Rounding. At the time of the visit the patient was wearing his hospital gown and standing beside his bed with his phone in his hand, which he dropped.  He articulated that he is not sure how he is doing or feeling as  handed him his phone.  He  accepted 's offer for prayer of comfort, healing and emphasis on his being patient with himself with his medical condition.    Patient nodded his head slightly after prayer, and medical staff entered about providing him a bath, since he might be discharged today.      listened empathically, provided prayer and the ministry of presence.    Patient advised that  is available as needed.    Chaplain Olinda Manriquez M.Div.

## 2024-03-18 NOTE — CARE COORDINATION
CM has reviewed pt chart    DCP: return to group home; CM to contact, dc order observed    1036: CM called pt group home ([facility]) and informed pt is cleared for dc. Mr. Roman able to have  staff pick pt up around 3pm. Mr. Roman asked about EEG, CM observed it is ordered, but not completed. CM notified attending.     1321: CM updated by primary nurse that EEG is not needed per tele-neuro.         Transition of Care Plan:    RUR: 15%  Prior Level of Functioning: group home assist  Disposition: return to [facility]  If SNF or IPR: Date FOC offered: n/a  Date FOC received: n/a  Accepting facility: n/a  Date authorization started with reference number: n/a  Date authorization received and expires: n/a  Follow up appointments: unit secretary to setup as needed  DME needed: none  Transportation at discharge: group home staff 1500 pickup time  IM/IMM Medicare/ letter given: n/a  Is patient a  and connected with VA? no  If yes, was [place] transfer form completed and VA notified? N/a  Caregiver Contact: yes mother and group home owner  Discharge Caregiver contacted prior to discharge? yes  Care Conference needed? no  Barriers to discharge: none

## 2024-03-18 NOTE — DISCHARGE INSTR - COC
None    Nursing Mobility/ADLs:  Walking   Independent  Transfer  Independent  Bathing  Independent  Dressing  Independent  Toileting  Independent  Feeding  Independent  Med Admin  Independent  Med Delivery   whole    Wound Care Documentation and Therapy:        Elimination:  Continence:   Bowel: Yes  Bladder: Yes  Urinary Catheter: None   Colostomy/Ileostomy/Ileal Conduit: No       Date of Last BM: 3/18/24    Intake/Output Summary (Last 24 hours) at 3/18/2024 1346  Last data filed at 3/17/2024 2022  Gross per 24 hour   Intake 300 ml   Output --   Net 300 ml     I/O last 3 completed shifts:  In: 300 [P.O.:300]  Out: -     Safety Concerns:     None    Impairments/Disabilities:      None    Nutrition Therapy:  Current Nutrition Therapy:   - Oral Diet:  General    Routes of Feeding: Oral  Liquids: Thin Liquids  Daily Fluid Restriction: no  Last Modified Barium Swallow with Video (Video Swallowing Test): not done    Treatments at the Time of Hospital Discharge:   Respiratory Treatments: N/A  Oxygen Therapy:  is not on home oxygen therapy.  Ventilator:    - No ventilator support    Rehab Therapies: N/A  Weight Bearing Status/Restrictions: No weight bearing restrictions  Other Medical Equipment (for information only, NOT a DME order):  N/A  Other Treatments: None    Patient's personal belongings (please select all that are sent with patient):  None    RN SIGNATURE:  Electronically signed by Lakhwinder Arana RN on 3/18/24 at 1:59 PM EDT    CASE MANAGEMENT/SOCIAL WORK SECTION    Inpatient Status Date: ***    Readmission Risk Assessment Score:  Readmission Risk              Risk of Unplanned Readmission:  9           Discharging to Facility/ Agency   Name:   Address:  Phone:  Fax:    Dialysis Facility (if applicable)   Name:  Address:  Dialysis Schedule:  Phone:  Fax:    / signature: {Esignature:[id number]}    PHYSICIAN SECTION    Prognosis: {Prognosis:7117462915}    Condition at Discharge: {MH Patient

## 2024-03-18 NOTE — PROGRESS NOTES
Pt. Had an order for EEG. Nurse called tele neurology nurse Umm to see if pt. Needed an EEG prior to discharge. Umm stated pt. Is cleared for discharge and does not need an EEG.

## 2024-03-18 NOTE — PROGRESS NOTES
..Discharge plan of care/case management plan validated with provider discharge order.       Pt. Being discharged back to his group home. Discharge instructions were reviewed with his staff. Staff verbalized an understanding of the discharge instructions.

## 2024-03-18 NOTE — PLAN OF CARE
Problem: Discharge Planning  Goal: Discharge to home or other facility with appropriate resources  Outcome: Progressing  Flowsheets (Taken 3/17/2024 2022)  Discharge to home or other facility with appropriate resources: Identify barriers to discharge with patient and caregiver     Problem: Skin/Tissue Integrity  Goal: Absence of new skin breakdown  Description: 1.  Monitor for areas of redness and/or skin breakdown  2.  Assess vascular access sites hourly  3.  Every 4-6 hours minimum:  Change oxygen saturation probe site  4.  Every 4-6 hours:  If on nasal continuous positive airway pressure, respiratory therapy assess nares and determine need for appliance change or resting period.  Outcome: Progressing     Problem: Neurosensory - Adult  Goal: Achieves stable or improved neurological status  Outcome: Progressing  Flowsheets (Taken 3/17/2024 2022)  Achieves stable or improved neurological status: Assess for and report changes in neurological status  Goal: Absence of seizures  Outcome: Progressing  Flowsheets (Taken 3/17/2024 2022)  Absence of seizures: If seizure occurs, turn head to side and suction secretions as needed  Goal: Remains free of injury related to seizures activity  Outcome: Progressing  Flowsheets (Taken 3/17/2024 2022)  Remains free of injury related to seizure activity: Maintain airway, patient safety  and administer oxygen as ordered  Goal: Achieves maximal functionality and self care  Outcome: Progressing  Flowsheets (Taken 3/17/2024 2022)  Achieves maximal functionality and self care: Monitor swallowing and airway patency with patient fatigue and changes in neurological status     Problem: Safety - Adult  Goal: Free from fall injury  Outcome: Progressing     Problem: ABCDS Injury Assessment  Goal: Absence of physical injury  Outcome: Progressing

## 2024-03-18 NOTE — CARE COORDINATION
03/18/24 1040   Service Assessment   Patient Orientation Unable to Assess  (received information from  owner)   Cognition Other (see comment)  (received info from  owner)   History Provided By Other (see comment)  (Group Home Owner M[facility] Roman)   Primary Caregiver Other (Comment)  (From [facility] )   Accompanied By/Relationship sitter at bedside   Support Systems Other (Comment);Parent  (group home staff)   Patient's Healthcare Decision Maker is: Named in Scanned ACP Document   PCP Verified by CM No   Prior Functional Level Assistance with the following:;Housework;Shopping;Cooking   Current Functional Level Assistance with the following:;Housework;Shopping;Cooking   Can patient return to prior living arrangement Yes   Ability to make needs known: Other (see comment)  (from  hx of ID)   Family able to assist with home care needs: No   Would you like for me to discuss the discharge plan with any other family members/significant others, and if so, who? Yes  (mother and  staff)   Financial Resources Medicaid   Community Resources None   CM/SW Referral Other (see comment)  (discharge planning)   Services At/After Discharge   Confirm Follow Up Transport Other (see comment)  (group home staff)     CM spoke with [facility] owner and pt's mother to discuss disposition. Pt recently moved into group home about 1 month ago. Pt does not use any DME at baseline.     Rx: Group Home deals with [facility], CM made priority pharmacy in system  Meds to Bed declined    Advance Care Planning     General Advance Care Planning (ACP) Conversation    Date of Conversation: 3/18/2024  Conducted with: Patient with Decision Making Capacity   Healthcare Decision Maker: Next of Kin by law (only applies in absence of above) (name) Marie Julien    Healthcare Decision Maker:    Primary Decision Maker: Ilda Julien - Parent - [phone]  Click here to complete Healthcare Decision Makers

## 2024-03-18 NOTE — DISCHARGE SUMMARY
Discharge Summary    Name: Maico Paez  [id number]  YOB: 1995 (Age: 28 y.o.)   Date of Admission: 3/15/2024  Date of Discharge: 3/18/2024  Attending Physician: Tae Engel MD    Discharge Diagnosis:   Principal Problem:    Seizure (HCC)  Active Problems:    Tonic-clonic epileptic seizures (HCC)  Resolved Problems:    * No resolved hospital problems. *  Tonic-clonic epileptic seizures  Influenza B    Consultations:  IP CONSULT TO TELE-NEUROLOGY      Brief Admission History/Reason for Admission Per Dorita Leyva MD:       Brief Hospital Course by Main Problems:   Maico Paez is a 28-year-old male with a history of developmental delay intellectual disabilities, aggressive behavior, psychosis and seizure disorder whose primary neurologist is Dr. Lynn.  Patient had multiple episodes of seizures after being diagnosed with influenza B and started on Tamiflu.  Tamiflu and influenza B actually lower the seizure threshold.  Patient has had no further seizures while on the addition of Keppra.  His Depakote levels are normal at 73.  Will continue Depakote although increased dose to 1000 mg twice daily and add Keppra 500 mg twice daily until re-evaluation with primary neurologist.  Patient is medically clear for discharge.  He has no symptoms of influenza B.  Teleneurology evaluation recommending continuation of above treatment.  CT of the head with no acute abnormality. Patient to return to group home.    Discharge Exam:  Patient seen and examined by me on discharge day.  Pertinent Findings:  Patient Vitals for the past 24 hrs:   BP Temp Temp src Pulse Resp SpO2   03/18/24 0045 (!) 104/58 98.1 °F (36.7 °C) Oral 61 19 98 %   03/17/24 2016 102/64 98.2 °F (36.8 °C) -- 71 18 98 %   03/17/24 1521 98/64 98.1 °F (36.7 °C) -- 69 16 100 %         Gen:    Not in distress  Chest: Clear lungs  CVS:   Regular rhythm.  No edema  Abd:  Soft, not distended, not 
over instructions, follow-up, prescriptions, and preparing report for sign off to her PCP) :  35 minutes